# Patient Record
Sex: MALE | Race: WHITE | Employment: FULL TIME | URBAN - METROPOLITAN AREA
[De-identification: names, ages, dates, MRNs, and addresses within clinical notes are randomized per-mention and may not be internally consistent; named-entity substitution may affect disease eponyms.]

---

## 2017-10-17 ENCOUNTER — HOSPITAL ENCOUNTER (OUTPATIENT)
Dept: CT IMAGING | Age: 67
Discharge: HOME | End: 2017-10-17
Attending: UROLOGY
Payer: MEDICARE

## 2017-10-17 DIAGNOSIS — R31.9 HEMATURIA: ICD-10-CM

## 2017-10-17 LAB — CREAT BLD-MCNC: 1.2 MG/DL (ref 0.6–1.3)

## 2017-10-17 PROCEDURE — 74011636320 HC RX REV CODE- 636/320: Performed by: UROLOGY

## 2017-10-17 PROCEDURE — 82565 ASSAY OF CREATININE: CPT

## 2017-10-17 PROCEDURE — 74011000258 HC RX REV CODE- 258: Performed by: UROLOGY

## 2017-10-17 PROCEDURE — 74178 CT ABD&PLV WO CNTR FLWD CNTR: CPT

## 2017-10-17 RX ORDER — SODIUM CHLORIDE 0.9 % (FLUSH) 0.9 %
10 SYRINGE (ML) INJECTION
Status: COMPLETED | OUTPATIENT
Start: 2017-10-17 | End: 2017-10-17

## 2017-10-17 RX ADMIN — Medication 10 ML: at 16:54

## 2017-10-17 RX ADMIN — SODIUM CHLORIDE 100 ML: 900 INJECTION, SOLUTION INTRAVENOUS at 16:54

## 2017-10-17 RX ADMIN — IOPAMIDOL 100 ML: 612 INJECTION, SOLUTION INTRAVENOUS at 16:53

## 2018-11-01 ENCOUNTER — HOSPITAL ENCOUNTER (OUTPATIENT)
Dept: GENERAL RADIOLOGY | Age: 68
Discharge: HOME | End: 2018-11-01
Payer: MEDICARE

## 2018-11-01 DIAGNOSIS — R05.9 COUGH: ICD-10-CM

## 2018-11-01 PROCEDURE — 71046 X-RAY EXAM CHEST 2 VIEWS: CPT

## 2018-11-12 ENCOUNTER — HOSPITAL ENCOUNTER (OUTPATIENT)
Dept: GENERAL RADIOLOGY | Age: 68
Discharge: HOME | End: 2018-11-12
Attending: OTOLARYNGOLOGY
Payer: MEDICARE

## 2018-11-12 DIAGNOSIS — K21.9 ESOPHAGEAL REFLUX: ICD-10-CM

## 2018-11-12 PROCEDURE — 74220 X-RAY XM ESOPHAGUS 1CNTRST: CPT

## 2022-11-15 ENCOUNTER — OFFICE VISIT (OUTPATIENT)
Dept: ORTHOPEDIC SURGERY | Age: 72
End: 2022-11-15
Payer: MEDICARE

## 2022-11-15 DIAGNOSIS — M25.512 ACUTE PAIN OF LEFT SHOULDER: Primary | ICD-10-CM

## 2022-11-15 DIAGNOSIS — M54.12 CERVICAL RADICULITIS: ICD-10-CM

## 2022-11-15 PROCEDURE — 1101F PT FALLS ASSESS-DOCD LE1/YR: CPT | Performed by: ORTHOPAEDIC SURGERY

## 2022-11-15 PROCEDURE — 3017F COLORECTAL CA SCREEN DOC REV: CPT | Performed by: ORTHOPAEDIC SURGERY

## 2022-11-15 PROCEDURE — G8427 DOCREV CUR MEDS BY ELIG CLIN: HCPCS | Performed by: ORTHOPAEDIC SURGERY

## 2022-11-15 PROCEDURE — G8432 DEP SCR NOT DOC, RNG: HCPCS | Performed by: ORTHOPAEDIC SURGERY

## 2022-11-15 PROCEDURE — G8421 BMI NOT CALCULATED: HCPCS | Performed by: ORTHOPAEDIC SURGERY

## 2022-11-15 PROCEDURE — G8536 NO DOC ELDER MAL SCRN: HCPCS | Performed by: ORTHOPAEDIC SURGERY

## 2022-11-15 PROCEDURE — 1123F ACP DISCUSS/DSCN MKR DOCD: CPT | Performed by: ORTHOPAEDIC SURGERY

## 2022-11-15 PROCEDURE — 99203 OFFICE O/P NEW LOW 30 MIN: CPT | Performed by: ORTHOPAEDIC SURGERY

## 2022-11-15 RX ORDER — METHYLPREDNISOLONE 4 MG/1
TABLET ORAL
Qty: 1 DOSE PACK | Refills: 0 | Status: SHIPPED | OUTPATIENT
Start: 2022-11-15

## 2022-11-15 NOTE — LETTER
11/15/2022    Patient: Cary Castillo   YOB: 1950   Date of Visit: 11/15/2022     Arie Gutierrez MD  [address] [address]  Via Fax: [phone]    Dear Arie Gutierrez MD,      Thank you for referring Mr. Kindra Spencer to [facility] for evaluation. My notes for this consultation are attached. If you have questions, please do not hesitate to call me. I look forward to following your patient along with you.       Sincerely,    Brian Allen MD

## 2022-11-15 NOTE — PROGRESS NOTES
Cary Castillo (: 1950) is a 67 y.o. male, patient, here for evaluation of the following chief complaint(s):  Shoulder Pain (Left shoulder pain)       HPI:    Patient presents with a chief complaint left shoulder pain. Patient had a left shoulder rotator cuff repair and open biceps tenodesis performed approximately 2 years ago. Over the past 10 to 14 days he has had increasing shoulder and neck pain with radiation into his scapula and down the lateral aspect of his left arm. States that the pain is alleviated when he raises his left hand over his head. Denies any recent injury. He is already going to physical therapy for his neck and states he has decreased range of motion of his neck. He also has noticed some decreased strength in his left shoulder. Allergies   Allergen Reactions    Shellfish Derived Itching       Current Outpatient Medications   Medication Sig    oxyCODONE IR (ROXICODONE) 5 mg immediate release tablet Take 2 Tabs by mouth every four (4) hours as needed for Pain. Max Daily Amount: 60 mg.    warfarin (COUMADIN) 2.5 mg tablet Take 1 Tab by mouth daily. cyanocobalamin (VITAMIN B-12) 100 mcg tablet Take 100 mcg by mouth daily. metFORMIN (GLUCOPHAGE) 500 mg tablet Take  by mouth two (2) times daily (with meals). terazosin (HYTRIN) 5 mg capsule Take 10 mg by mouth nightly. Indications: SYMPTOMATIC BENIGN PROSTATIC HYPERPLASIA    atorvastatin (LIPITOR) 10 mg tablet Take 10 mg by mouth nightly. No current facility-administered medications for this visit. Past Medical History:   Diagnosis Date    Arthritis     Asthma     Diabetes ([facility] [address]     Ill-defined condition     NAION R EYE PARTIALLY BLIND    Sleep apnea     WEARS CPAP        Past Surgical History:   Procedure Laterality Date    HX ORTHOPAEDIC      MENISCUS REPAIR R    HX ORTHOPAEDIC      ACHILLIS TENDON REPAIR R     HX ORTHOPAEDIC      R WRIST REPAIR FROM FX.     LA ABDOMEN SURGERY PROC UNLISTED      UMBILICAL HERNIA REPAIR       Family History   Problem Relation Age of Onset    Seizures Mother     Alzheimer's Disease Father         Social History     Socioeconomic History    Marital status:      Spouse name: Not on file    Number of children: Not on file    Years of education: Not on file    Highest education level: Not on file   Occupational History    Not on file   Tobacco Use    Smoking status: Never    Smokeless tobacco: Never   Substance and Sexual Activity    Alcohol use: Yes     Comment: 2 DRINKS A MONTH    Drug use: Not on file    Sexual activity: Not on file   Other Topics Concern    Not on file   Social History Narrative    Not on file     Social Determinants of Health     Financial Resource Strain: Not on file   Food Insecurity: Not on file   Transportation Needs: Not on file   Physical Activity: Not on file   Stress: Not on file   Social Connections: Not on file   Intimate Partner Violence: Not on file   Housing Stability: Not on file       Review of Systems   Musculoskeletal:         Left shoulder pain      Vitals: There were no vitals taken for this visit. There is no height or weight on file to calculate BMI. Ortho Exam     Patient is alert and oriented x3. Patient is in no acute distress. Patient ambulates with a nonantalgic gait. Left shoulder:  No shoulder girdle atrophy. There is no soft tissue swelling, ecchymosis, abrasions or lacerations. Active range of motion of the shoulder is limited to 130° of forward flexion, 120° of lateral abduction and 70° of external rotation. Internal rotation is to the SI joint and is painful. Passive range of motion is full with a painful impingement sign and painful Jo sign. Rotator cuff strength is painful to evaluate and is at 3/5 with forward flexion, external rotation and lateral abduction. Internal rotational strength is maintained. There is no crepitation about the joint.   Palpation of the [facility] joint does not reproduce discomfort and there is no pain elicited with cross-body adduction. Strength of the extremity is 4/5 strength at biceps and 5/5 at triceps/wrist extension. DTR's are intact at +2/4 and are symmetrical.      Cervical range of motion is decreased with pain to palpation along the paraspinal musculature and medial border of the scapula. Spurling's sign is positive with radiation into his lateral shoulder. Patient has normal strength with bicep and tricep. He has normal wrist extensor strength. Neurovascular examination is in tact. XR Results (most recent):  Results from Appointment encounter on 11/15/22    XR SHOULDER LT AP/LAT MIN 2 V    Narrative  Three-view x-ray of the left shoulder reveals retained hardware from prior bicep tenodesis. He has some minimal changes consistent with glenohumeral arthritis. Otherwise unremarkable         ASSESSMENT/PLAN:    We discussed the patient that he does have weakness and positive cervical radiculopathy physical exam signs. He has already begun physical therapy for his neck prescribed by an outside provider. He should continue physical therapy. We will add a Medrol Dosepak to decrease his inflammation. He will return to the office in 4 weeks. If he does not improve and still has the same symptoms we will consider a cervical MRI.   Patient is to call if his symptoms worsen and I would recommend a MRI of his cervical spine        Basim Trinh MD

## 2022-12-15 ENCOUNTER — OFFICE VISIT (OUTPATIENT)
Dept: ORTHOPEDIC SURGERY | Age: 72
End: 2022-12-15
Payer: MEDICARE

## 2022-12-15 VITALS — WEIGHT: 217 LBS | BODY MASS INDEX: 31.59 KG/M2

## 2022-12-15 DIAGNOSIS — M54.2 NECK PAIN: Primary | ICD-10-CM

## 2022-12-15 DIAGNOSIS — M54.12 CERVICAL RADICULITIS: ICD-10-CM

## 2022-12-15 NOTE — PROGRESS NOTES
Abena Tinsley (: 1950) is a 67 y.o. male, patient, here for evaluation of the following chief complaint(s):  Shoulder Pain (Left shoulder pain)       HPI:    Patient returns to the office with recurrent discomfort of the left upper extremity. Once again, he presents today with pain that seems to radiate all the way down the arm. He does have some shoulder pain. He has had physical therapy. He states he feels a little bit better. However, his chief complaint is he still notes some weakness of the arm. I asked him specifically if he felt the weakness was secondary to pain. He states no its not really painful it is just weak    Allergies   Allergen Reactions    Shellfish Derived Itching       Current Outpatient Medications   Medication Sig    methylPREDNISolone (MEDROL DOSEPACK) 4 mg tablet Per dose pack instructions    oxyCODONE IR (ROXICODONE) 5 mg immediate release tablet Take 2 Tabs by mouth every four (4) hours as needed for Pain. Max Daily Amount: 60 mg.    warfarin (COUMADIN) 2.5 mg tablet Take 1 Tab by mouth daily. cyanocobalamin (VITAMIN B-12) 100 mcg tablet Take 100 mcg by mouth daily. metFORMIN (GLUCOPHAGE) 500 mg tablet Take  by mouth two (2) times daily (with meals). terazosin (HYTRIN) 5 mg capsule Take 10 mg by mouth nightly. Indications: SYMPTOMATIC BENIGN PROSTATIC HYPERPLASIA    atorvastatin (LIPITOR) 10 mg tablet Take 10 mg by mouth nightly. No current facility-administered medications for this visit. Past Medical History:   Diagnosis Date    Arthritis     Asthma     Diabetes ([address]     Ill-defined condition     NAION R EYE PARTIALLY BLIND    Sleep apnea     WEARS CPAP        Past Surgical History:   Procedure Laterality Date    HX ORTHOPAEDIC      MENISCUS REPAIR R    HX ORTHOPAEDIC      ACHILLIS TENDON REPAIR R     HX ORTHOPAEDIC      R WRIST REPAIR FROM FX.     CT ABDOMEN SURGERY PROC UNLISTED      UMBILICAL HERNIA REPAIR       Family History   Problem Relation Age of Onset    Seizures Mother     Alzheimer's Disease Father         Social History     Socioeconomic History    Marital status:      Spouse name: Not on file    Number of children: Not on file    Years of education: Not on file    Highest education level: Not on file   Occupational History    Not on file   Tobacco Use    Smoking status: Never    Smokeless tobacco: Never   Substance and Sexual Activity    Alcohol use: Yes     Comment: 2 DRINKS A MONTH    Drug use: Not on file    Sexual activity: Not on file   Other Topics Concern    Not on file   Social History Narrative    Not on file     Social Determinants of Health     Financial Resource Strain: Not on file   Food Insecurity: Not on file   Transportation Needs: Not on file   Physical Activity: Not on file   Stress: Not on file   Social Connections: Not on file   Intimate Partner Violence: Not on file   Housing Stability: Not on file       Review of Systems   Musculoskeletal:         Shoulder pain     Vitals: Wt 217 lb (98.4 kg)   BMI 31.59 kg/m²    Body mass index is 31.59 kg/m². Ortho Exam     Patient is alert and oriented x3. Patient is in no acute distress. Patient ambulates with a nonantalgic gait. Left shoulder: Patient has full range of motion left shoulder. He has negative impingement sign mildly positive Jo test.  His strength is decreased at 4/5 in all planes. He has no crepitation. He has no swelling noted distally. Neurovascular examination is intact. Cervical spine: Patient has limitation of cervical spine rotation particularly lateral rotation to the right and extension. He has recurrent pain that radiates into his left trapezius with manipulation of his neck. A Spurling sign however is negative. Perhaps a small amount of bicep weakness when compared to the contralateral arm. Otherwise strength is normal.    ASSESSMENT/PLAN:    Patient returns to the office with recurrent discomfort of left upper extremity.   Once again, his symptoms are more consistent with cervical radiculitis. Therefore, I would suggest proceeding with an MRI evaluation. Patient agrees and is to return to the office after the MRI we will put a hold on physical therapy at this stage.         Pearl Marin MD

## 2022-12-15 NOTE — LETTER
12/15/2022    Patient: Ravi Jorge   YOB: 1950   Date of Visit: 12/15/2022     Darron Devine MD  [address] [address]  Via Fax: [phone]    Dear Darron Devine MD,      Thank you for referring Mr. Moncho Castle to [facility] for evaluation. My notes for this consultation are attached. If you have questions, please do not hesitate to call me. I look forward to following your patient along with you.       Sincerely,    Heriberto Walker MD

## 2023-01-09 ENCOUNTER — OFFICE VISIT (OUTPATIENT)
Dept: ORTHOPEDIC SURGERY | Age: 73
End: 2023-01-09
Payer: MEDICARE

## 2023-01-09 DIAGNOSIS — M54.12 CERVICAL RADICULITIS: Primary | ICD-10-CM

## 2023-01-09 NOTE — LETTER
1/9/2023    Patient: Foster Porter   YOB: 1950   Date of Visit: 1/9/2023     Ashwin Beckman MD  [address] [address]  Via Fax: [phone]    Dear Ashwin Beckman MD,      Thank you for referring Mr. Lobo Pruitt to [facility] for evaluation. My notes for this consultation are attached. If you have questions, please do not hesitate to call me. I look forward to following your patient along with you.       Sincerely,    Lizzie Rowe MD

## 2023-01-09 NOTE — PROGRESS NOTES
Hillary Chen (: 1950) is a 67 y.o. male, patient, here for evaluation of the following chief complaint(s):  Shoulder Pain (Left shoulder MRI results)       HPI:    Patient presents the office today status post MRI evaluation of cervical spine. Patient continues to have on and off discomfort of the left upper extremity. Allergies   Allergen Reactions    Shellfish Derived Itching       Current Outpatient Medications   Medication Sig    methylPREDNISolone (MEDROL DOSEPACK) 4 mg tablet Per dose pack instructions    oxyCODONE IR (ROXICODONE) 5 mg immediate release tablet Take 2 Tabs by mouth every four (4) hours as needed for Pain. Max Daily Amount: 60 mg.    warfarin (COUMADIN) 2.5 mg tablet Take 1 Tab by mouth daily. cyanocobalamin (VITAMIN B-12) 100 mcg tablet Take 100 mcg by mouth daily. metFORMIN (GLUCOPHAGE) 500 mg tablet Take  by mouth two (2) times daily (with meals). terazosin (HYTRIN) 5 mg capsule Take 10 mg by mouth nightly. Indications: SYMPTOMATIC BENIGN PROSTATIC HYPERPLASIA    atorvastatin (LIPITOR) 10 mg tablet Take 10 mg by mouth nightly. No current facility-administered medications for this visit. Past Medical History:   Diagnosis Date    Arthritis     Asthma     Diabetes ([address]     Ill-defined condition     NAION R EYE PARTIALLY BLIND    Sleep apnea     WEARS CPAP        Past Surgical History:   Procedure Laterality Date    HX ORTHOPAEDIC      MENISCUS REPAIR R    HX ORTHOPAEDIC      ACHILLIS TENDON REPAIR R     HX ORTHOPAEDIC      R WRIST REPAIR FROM FX.     SD ABDOMEN SURGERY PROC UNLISTED      UMBILICAL HERNIA REPAIR       Family History   Problem Relation Age of Onset    Seizures Mother     Alzheimer's Disease Father         Social History     Socioeconomic History    Marital status:      Spouse name: Not on file    Number of children: Not on file    Years of education: Not on file    Highest education level: Not on file   Occupational History    Not on file Tobacco Use    Smoking status: Never    Smokeless tobacco: Never   Substance and Sexual Activity    Alcohol use: Yes     Comment: 2 DRINKS A MONTH    Drug use: Not on file    Sexual activity: Not on file   Other Topics Concern    Not on file   Social History Narrative    Not on file     Social Determinants of Health     Financial Resource Strain: Not on file   Food Insecurity: Not on file   Transportation Needs: Not on file   Physical Activity: Not on file   Stress: Not on file   Social Connections: Not on file   Intimate Partner Violence: Not on file   Housing Stability: Not on file       Review of Systems   Musculoskeletal:         Left shoulder      Vitals: There were no vitals taken for this visit. There is no height or weight on file to calculate BMI. Ortho Exam     Patient is alert and oriented x3. Patient is in no acute distress. Patient ambulates with a nonantalgic gait. Left shoulder: Patient has full range of motion left shoulder. He has negative impingement sign mildly positive Jo test.  His strength is decreased at 4/5 in all planes. He has no crepitation. He has no swelling noted distally. Neurovascular examination is intact. Cervical spine: Patient has limitation of cervical spine rotation particularly lateral rotation to the right and extension. He has recurrent pain that radiates into his left trapezius with manipulation of his neck. A Spurling sign however is negative. Perhaps a small amount of bicep weakness when compared to the contralateral arm. Otherwise strength is normal      MRI evaluation shows evidence of C4-5 disc/facet compromise particularly along the lateral foramen of C4-5 left. ASSESSMENT/PLAN:    Finding on the MRI are more consistent with the patient's examination. Patient has an appointment already scheduled with Dr. Wes Benton. I have encouraged him to hold onto that appointment. This may require surgical attention. We talked about other options. This will be addressed more through the expertise of Dr. Lise Gamez.         Keith Mtz MD not applicable (Male)

## 2023-01-19 ENCOUNTER — OFFICE VISIT (OUTPATIENT)
Dept: ORTHOPEDIC SURGERY | Age: 73
End: 2023-01-19
Payer: MEDICARE

## 2023-01-19 VITALS — BODY MASS INDEX: 30.78 KG/M2 | WEIGHT: 215 LBS | HEIGHT: 70 IN

## 2023-01-19 DIAGNOSIS — M54.12 CERVICAL RADICULITIS: Primary | ICD-10-CM

## 2023-01-19 DIAGNOSIS — M50.90 CERVICAL DISC DISEASE: ICD-10-CM

## 2023-01-19 DIAGNOSIS — M48.02 CERVICAL STENOSIS OF SPINAL CANAL: ICD-10-CM

## 2023-01-19 NOTE — PROGRESS NOTES
1. Have you been to the ER, urgent care clinic since your last visit? Hospitalized since your last visit? No    2. Have you seen or consulted any other health care providers outside of the [address] [place] [address] since your last visit? Include any pap smears or colon screening.  No    Chief Complaint   Patient presents with    Neck Pain     Left Shoulder, Cervical MRI 12/28/22

## 2023-01-19 NOTE — LETTER
1/20/2023    Patient: Delia Haddad   YOB: 1950   Date of Visit: 1/19/2023     Kari Pimentel MD  [address] [facility] [address]  Via Fax: [phone]    Dear Kari Pimentel MD,      Thank you for referring Mr. Randall Councilman to [facility] for evaluation. My notes for this consultation are attached. If you have questions, please do not hesitate to call me. I look forward to following your patient along with you.       Sincerely,    Gonzalo Kitchen MD

## 2023-01-20 DIAGNOSIS — M54.2 NECK PAIN: ICD-10-CM

## 2023-01-20 DIAGNOSIS — M54.12 CERVICAL RADICULITIS: Primary | ICD-10-CM

## 2023-01-20 DIAGNOSIS — M25.512 ACUTE PAIN OF LEFT SHOULDER: ICD-10-CM

## 2023-01-20 NOTE — PROGRESS NOTES
Ketan Burns (: 1950) is a 67 y.o. male, patient, here for evaluation of the following chief complaint(s):  Neck Pain (Left Shoulder, Cervical MRI 22)       ASSESSMENT/PLAN:    Below is the assessment and plan developed based on review of pertinent history, physical exam, labs, studies, and medications. He has had chronic worsening severe left shoulder pain and weakness. MRI was reviewed today as well as physical exam.  He has a left-sided foraminal stenosis due to disc bulging as well as facet hypertrophy causing severe foraminal stenosis at C4-5. I think is a candidate for an ACDF at that level given the conservative treatment that he has failed as well as the weakness that he has persistently in the left shoulder. Risk and benefits were discussed with him and his wife. He would like to proceed. Procedure: ACDF C4-5      The risks and benefits were discussed at length with the patient and the patient has elected to proceed. Indications for surgery include failed conservative treatment. Alternative treatments, risks and the perioperative course were discussed with the patient. All questions were answered. The risks and benefits of the procedure were explained. Benefits include definitive diagnosis, relief of pain, elimination of deformity and improved function. Risks of surgery including bleeding, infection, weakness, numbness, CSF leak, failure to improve symptoms, exacerbation of medical co-morbidities and even death were discussed with the patient. 1. Cervical radiculitis  2. Cervical disc disease  3. Cervical stenosis of spinal canal      Return in about 6 weeks (around 3/2/2023) for Physician Assistant for Postop Visit. SUBJECTIVE/OBJECTIVE:  Ketan Burns (: 1950) is a 67 y.o. male. No flowsheet data found. He comes in today on referral for severe left shoulder pain and weakness.   He has had bilateral shoulder surgeries but over the last 6 months he has had increasing pain and weakness in the left shoulder. Pain is worse at night. He has had an evaluation by Dr. Miki Carbajal who feels that it is coming from his neck. He has an MRI of his cervical spine. He cannot lay on the left side. He denies any bowel bladder difficulties. Imaging:    XR Results (most recent):    I reviewed his outside x-rays. He has maintained cervical lordosis. No subluxations noted. No fractures or lytic lesions. MRI Results (most recent):  Results from Appointment encounter on 12/28/22    MRI CERV SPINE WO CONT    Narrative  EXAM: MRI CERV SPINE WO CONT    INDICATION: .  Pain    Exam: MRI cervical spine. Comparisons: none    Sequences: Sagittal T1, T2, and STIR. Axial T1, T2. No contrast.    FINDINGS: Alignment is normal. There are no suspicious marrow lesions or  evidence of acute bony trauma. The visualized posterior fossa and cord signal  are normal. Paraspinous soft tissues are within normal limits. Craniocervical junction: Intact. Without stenosis    C2-C3: Left-sided uncovertebral degenerative change and facet arthropathy. Mild  left foraminal narrowing    C3-C4: Right-sided uncovertebral degenerative change. Bilateral facet  arthropathy. Moderate right foraminal narrowing without canal stenosis    C4-C5: Left-sided uncovertebral degenerative change and facet arthropathy. Severe left foraminal narrowing and slight indentation of the thecal sac    C5-C6: Slight disc osteophytic bulge with uncovertebral degenerative change and  left facet arthropathy. Minimal indentation of the ventral thecal sac with mild  left greater than right foraminal narrowing    C6-C7: Small disc osteophytic bulge with facet arthropathy and uncovertebral  degenerative change. Mild canal and foraminal narrowing    Examination of the upper thoracic spine demonstrates no significant stenosis. Impression  1.  Multilevel degenerative change detailed by level above most significant at  C4-5 Allergies   Allergen Reactions    Shellfish Derived Itching       Current Outpatient Medications   Medication Sig    methylPREDNISolone (MEDROL DOSEPACK) 4 mg tablet Per dose pack instructions    oxyCODONE IR (ROXICODONE) 5 mg immediate release tablet Take 2 Tabs by mouth every four (4) hours as needed for Pain. Max Daily Amount: 60 mg.    warfarin (COUMADIN) 2.5 mg tablet Take 1 Tab by mouth daily. cyanocobalamin (VITAMIN B12) 100 mcg tablet Take 100 mcg by mouth daily. metFORMIN (GLUCOPHAGE) 500 mg tablet Take  by mouth two (2) times daily (with meals). terazosin (HYTRIN) 5 mg capsule Take 10 mg by mouth nightly. Indications: SYMPTOMATIC BENIGN PROSTATIC HYPERPLASIA    atorvastatin (LIPITOR) 10 mg tablet Take 10 mg by mouth nightly. No current facility-administered medications for this visit. Past Medical History:   Diagnosis Date    Arthritis     Asthma     Diabetes ([address]     Ill-defined condition     NAION R EYE PARTIALLY BLIND    Sleep apnea     WEARS CPAP        Past Surgical History:   Procedure Laterality Date    HX ORTHOPAEDIC      MENISCUS REPAIR R    HX ORTHOPAEDIC      ACHILLIS TENDON REPAIR R     HX ORTHOPAEDIC      R WRIST REPAIR FROM FX. IN UNLISTED PROCEDURE ABDOMEN PERITONEUM & OMENTUM      UMBILICAL HERNIA REPAIR       Family History   Problem Relation Age of Onset    Seizures Mother     Alzheimer's Disease Father         Social History     Tobacco Use    Smoking status: Never    Smokeless tobacco: Never   Substance Use Topics    Alcohol use: Yes     Comment: 2 DRINKS A MONTH        Review of Systems       Vitals:  Ht 5' 10\" (1.778 m)   Wt 215 lb (97.5 kg)   BMI 30.85 kg/m²    Body mass index is 30.85 kg/m². Ortho Exam       Alert and [place]  x 3    Normal gait and station; normal posture    No assistive devices today.     Lumbar spine:  Examination of the lumbar spine demonstrates no tenderness on palpation, no pain, no swelling or edema, with normal lumbar range of motion. Thoracic spine: Examination of the thoracic spine demonstrates no tenderness on palpation, no pain, no swelling or edema. Normal sensation and range of motion. Cervical spine:     Examination of the cervical spine demonstrates on inspection: No abnormal cutaneous markings. No surgical incisions. No shoulder asymmetry. No masses. On palpation: Minimal muscular spasm noted. Tenderness on palpation superior deltoid region. Pain in the upper biceps region. Range of motion: Cervical range of motion is well-maintained in today. Slight decrease in rotation to the left. No guarding noted. Motor examination:  Right deltoid 5/5,  left deltoid 3/5, right bicep 5/5, left bicep 4/5, right wrist extensor 5/5, left wrist extensor 5/5, right triceps 5/5, left triceps 5/5, right intrinsics 5/5, left intrinsics    Sensory examination: Reveals no deficits    Reflexes: Left bicep 2/2, left triceps 2/2, right biceps 2/2, right triceps 2/2    Functional testing: Spurling's exam is positive on the left; upper limb tension test is positive on the left    Argueta's signs negative bilaterally. An electronic signature was used to authenticate this note.   -- Jenni Velásquez MD

## 2023-01-20 NOTE — PATIENT INSTRUCTIONS
Cervical Spinal Fusion: Before Your Surgery  What is cervical spinal fusion? Cervical spinal fusion is surgery that joins two or more of the vertebrae in your neck. When these bones are joined together, it's called fusion. After the joints are fused, they can no longer move. During the surgery, the doctor uses bone to make a \"bridge\" between your vertebrae. This bridge may be strengthened with metal plates and screws. In most cases, the doctor uses bone from another part of your body or bone that has been donated to a bone bank. But sometimes human-made bone is used. To do the surgery, the doctor makes a cut in either the front or the back of your neck. The cut is called an incision. It leaves a scar that fades with time. After surgery, you will have a short hospital stay. Your neck will feel stiff or sore. You will get medicine to help with pain. Most people can go back to work after 4 to 6 weeks. But it may take a few months to get back to your usual activities. How do you prepare for surgery? Surgery can be stressful. This information will help you understand what you can expect. And it will help you safely prepare for surgery. Preparing for surgery    Be sure you have someone to take you home. Anesthesia and pain medicine will make it unsafe for you to drive or get home on your own. Understand exactly what surgery is planned, along with the risks, benefits, and other options. If you take a medicine that prevents blood clots, your doctor may tell you to stop taking it before your surgery. Or your doctor may tell you to keep taking it. (These medicines include aspirin and other blood thinners.) Make sure that you understand exactly what your doctor wants you to do. Tell your doctor ALL the medicines, vitamins, supplements, and herbal remedies you take. Some may increase the risk of problems during your surgery.  Your doctor will tell you if you should stop taking any of them before the surgery and how soon to do it. Make sure your doctor and the hospital have a copy of your advance directive. If you don't have one, you may want to prepare one. It lets others know your health care wishes. It's a good thing to have before any type of surgery or procedure. What happens on the day of surgery? Follow the instructions exactly about when to stop eating and drinking. If you don't, your surgery may be canceled. If your doctor told you to take your medicines on the day of surgery, take them with only a sip of water. Take a bath or shower before you come in for your surgery. Do not apply lotions, perfumes, deodorants, or nail polish. Do not shave the surgical site yourself. Take off all jewelry and piercings. And take out contact lenses, if you wear them. At the hospital or surgery center   Bring a picture ID. The area for surgery is often marked to make sure there are no errors. You will be kept comfortable and safe by your anesthesia provider. You will be asleep during the surgery. The surgery usually takes 2 to 4 hours. If more than two vertebrae are being fused together, it will take longer. When you wake up, you will be lying on your back. You will have a soft or hard collar around your neck. This will protect and support your neck. It will also keep you from turning your head. You may have a small plastic tube coming out of your incision. This is to drain fluids. It's usually taken out in 1 or 2 days. When should you call your doctor? You have questions or concerns. You do not understand how to prepare for your surgery. You become ill before surgery (such as fever, flu, or a cold). You need to reschedule or have changed your mind about having the surgery. Where can you learn more? Go to http://www.gray.com/  Enter N543 in the search box to learn more about \"Cervical Spinal Fusion: Before Your Surgery. \"  Current as of: March 9, 2022               Content Version: 13.4  © 2021-6079 Healthwise, [facility]. Care instructions adapted under license by [employer] (which disclaims liability or warranty for this information). If you have questions about a medical condition or this instruction, always ask your healthcare professional. [place] [address] any warranty or liability for your use of this information.

## 2023-02-08 ENCOUNTER — HOSPITAL ENCOUNTER (OUTPATIENT)
Dept: PREADMISSION TESTING | Age: 73
Discharge: HOME | End: 2023-02-08
Payer: MEDICARE

## 2023-02-08 VITALS
HEART RATE: 87 BPM | BODY MASS INDEX: 32.82 KG/M2 | WEIGHT: 229.28 LBS | TEMPERATURE: 98.3 F | SYSTOLIC BLOOD PRESSURE: 157 MMHG | HEIGHT: 70 IN | DIASTOLIC BLOOD PRESSURE: 79 MMHG

## 2023-02-08 LAB
ABO + RH BLD: NORMAL
APPEARANCE UR: CLEAR
BACTERIA URNS QL MICRO: NEGATIVE /HPF
BILIRUB UR QL: NEGATIVE
BLOOD GROUP ANTIBODIES SERPL: NORMAL
COLOR UR: NORMAL
EPITH CASTS URNS QL MICRO: NORMAL /LPF
GLUCOSE UR STRIP.AUTO-MCNC: NEGATIVE MG/DL
HGB UR QL STRIP: NEGATIVE
KETONES UR QL STRIP.AUTO: NEGATIVE MG/DL
LEUKOCYTE ESTERASE UR QL STRIP.AUTO: NEGATIVE
NITRITE UR QL STRIP.AUTO: NEGATIVE
PH UR STRIP: 5.5 (ref 5–8)
PROT UR STRIP-MCNC: NEGATIVE MG/DL
RBC #/AREA URNS HPF: NORMAL /HPF (ref 0–5)
SP GR UR REFRACTOMETRY: 1.02 (ref 1–1.03)
SPECIMEN EXP DATE BLD: NORMAL
UA: UC IF INDICATED,UAUC: NORMAL
UROBILINOGEN UR QL STRIP.AUTO: 0.2 EU/DL (ref 0.2–1)
WBC URNS QL MICRO: NORMAL /HPF (ref 0–4)

## 2023-02-08 PROCEDURE — 86900 BLOOD TYPING SEROLOGIC ABO: CPT

## 2023-02-08 PROCEDURE — 36415 COLL VENOUS BLD VENIPUNCTURE: CPT

## 2023-02-08 PROCEDURE — 81001 URINALYSIS AUTO W/SCOPE: CPT

## 2023-02-08 RX ORDER — GLIMEPIRIDE 1 MG/1
1 TABLET ORAL
COMMUNITY

## 2023-02-08 RX ORDER — HYDROCHLOROTHIAZIDE 25 MG/1
25 TABLET ORAL 2 TIMES DAILY
COMMUNITY

## 2023-02-08 RX ORDER — FINASTERIDE 5 MG/1
5 TABLET, FILM COATED ORAL DAILY
COMMUNITY

## 2023-02-08 RX ORDER — LEVOTHYROXINE AND LIOTHYRONINE 38; 9 UG/1; UG/1
60 TABLET ORAL DAILY
COMMUNITY

## 2023-02-08 RX ORDER — DOXYCYCLINE 100 MG/1
100 CAPSULE ORAL 2 TIMES DAILY
COMMUNITY

## 2023-02-08 RX ORDER — LOSARTAN POTASSIUM 25 MG/1
25 TABLET ORAL DAILY
COMMUNITY

## 2023-02-08 NOTE — H&P
Yonas President (: 1950) is a 67 y.o. male, patient, here for evaluation of the following chief complaint(s):  Neck Pain (Left Shoulder, Cervical MRI 22)        ASSESSMENT/PLAN:     Below is the assessment and plan developed based on review of pertinent history, physical exam, labs, studies, and medications. He has had chronic worsening severe left shoulder pain and weakness. MRI was reviewed today as well as physical exam.  He has a left-sided foraminal stenosis due to disc bulging as well as facet hypertrophy causing severe foraminal stenosis at C4-5. I think is a candidate for an ACDF at that level given the conservative treatment that he has failed as well as the weakness that he has persistently in the left shoulder. Risk and benefits were discussed with him and his wife. He would like to proceed. Procedure: ACDF C4-5        The risks and benefits were discussed at length with the patient and the patient has elected to proceed. Indications for surgery include failed conservative treatment. Alternative treatments, risks and the perioperative course were discussed with the patient. All questions were answered. The risks and benefits of the procedure were explained. Benefits include definitive diagnosis, relief of pain, elimination of deformity and improved function. Risks of surgery including bleeding, infection, weakness, numbness, CSF leak, failure to improve symptoms, exacerbation of medical co-morbidities and even death were discussed with the patient. 1. Cervical radiculitis  2. Cervical disc disease  3. Cervical stenosis of spinal canal        Return in about 6 weeks (around 3/2/2023) for Physician Assistant for Postop Visit. Date of Surgery Update:  Yonas Prestheodore was seen and examined. History and physical has been reviewed. The patient has been examined.  There have been no significant clinical changes since the completion of the originally dated History and Physical.    Signed By: Wai Nicholson MD     February 15, 2023 9:51 AM             SUBJECTIVE/OBJECTIVE:  Celestine Frye (: 1950) is a 67 y.o. male. No flowsheet data found. He comes in today on referral for severe left shoulder pain and weakness. He has had bilateral shoulder surgeries but over the last 6 months he has had increasing pain and weakness in the left shoulder. Pain is worse at night. He has had an evaluation by Dr. Suleman Youngblood who feels that it is coming from his neck. He has an MRI of his cervical spine. He cannot lay on the left side. He denies any bowel bladder difficulties. Imaging:     XR Results (most recent):     I reviewed his outside x-rays. He has maintained cervical lordosis. No subluxations noted. No fractures or lytic lesions. MRI Results (most recent):  Results from Appointment encounter on 22     MRI CERV SPINE WO CONT     Narrative  EXAM: MRI CERV SPINE WO CONT     INDICATION: .  Pain     Exam: MRI cervical spine. Comparisons: none     Sequences: Sagittal T1, T2, and STIR. Axial T1, T2. No contrast.     FINDINGS: Alignment is normal. There are no suspicious marrow lesions or  evidence of acute bony trauma. The visualized posterior fossa and cord signal  are normal. Paraspinous soft tissues are within normal limits. Craniocervical junction: Intact. Without stenosis     C2-C3: Left-sided uncovertebral degenerative change and facet arthropathy. Mild  left foraminal narrowing     C3-C4: Right-sided uncovertebral degenerative change. Bilateral facet  arthropathy. Moderate right foraminal narrowing without canal stenosis     C4-C5: Left-sided uncovertebral degenerative change and facet arthropathy. Severe left foraminal narrowing and slight indentation of the thecal sac     C5-C6: Slight disc osteophytic bulge with uncovertebral degenerative change and  left facet arthropathy.  Minimal indentation of the ventral thecal sac with mild  left greater than right foraminal narrowing     C6-C7: Small disc osteophytic bulge with facet arthropathy and uncovertebral  degenerative change. Mild canal and foraminal narrowing     Examination of the upper thoracic spine demonstrates no significant stenosis. Impression  1. Multilevel degenerative change detailed by level above most significant at  C4-5                Allergies   Allergen Reactions    Shellfish Derived Itching              Current Outpatient Medications   Medication Sig    methylPREDNISolone (MEDROL DOSEPACK) 4 mg tablet Per dose pack instructions    oxyCODONE IR (ROXICODONE) 5 mg immediate release tablet Take 2 Tabs by mouth every four (4) hours as needed for Pain. Max Daily Amount: 60 mg.    warfarin (COUMADIN) 2.5 mg tablet Take 1 Tab by mouth daily. cyanocobalamin (VITAMIN B12) 100 mcg tablet Take 100 mcg by mouth daily. metFORMIN (GLUCOPHAGE) 500 mg tablet Take  by mouth two (2) times daily (with meals). terazosin (HYTRIN) 5 mg capsule Take 10 mg by mouth nightly. Indications: SYMPTOMATIC BENIGN PROSTATIC HYPERPLASIA    atorvastatin (LIPITOR) 10 mg tablet Take 10 mg by mouth nightly. No current facility-administered medications for this visit. Past Medical History:   Diagnosis Date    Arthritis      Asthma      Diabetes ([address]      Ill-defined condition       NAION R EYE PARTIALLY BLIND    Sleep apnea       WEARS CPAP               Past Surgical History:   Procedure Laterality Date    HX ORTHOPAEDIC         MENISCUS REPAIR R    HX ORTHOPAEDIC         ACHILLIS TENDON REPAIR R     HX ORTHOPAEDIC         R WRIST REPAIR FROM FX. ME UNLISTED PROCEDURE ABDOMEN PERITONEUM & OMENTUM         UMBILICAL HERNIA REPAIR               Family History   Problem Relation Age of Onset    Seizures Mother      Alzheimer's Disease Father           Social History            Tobacco Use    Smoking status: Never    Smokeless tobacco: Never   Substance Use Topics    Alcohol use:  Yes Comment: 2 DRINKS A MONTH         Review of Systems        Vitals:  Ht 5' 10\" (1.778 m)   Wt 215 lb (97.5 kg)   BMI 30.85 kg/m²    Body mass index is 30.85 kg/m². Ortho Exam         Alert and [place]  x 3     Normal gait and station; normal posture     No assistive devices today. Lumbar spine:  Examination of the lumbar spine demonstrates no tenderness on palpation, no pain, no swelling or edema, with normal lumbar range of motion. Thoracic spine: Examination of the thoracic spine demonstrates no tenderness on palpation, no pain, no swelling or edema. Normal sensation and range of motion. Cervical spine:      Examination of the cervical spine demonstrates on inspection: No abnormal cutaneous markings. No surgical incisions. No shoulder asymmetry. No masses. On palpation: Minimal muscular spasm noted. Tenderness on palpation superior deltoid region. Pain in the upper biceps region. Range of motion: Cervical range of motion is well-maintained in today. Slight decrease in rotation to the left. No guarding noted. Motor examination:  Right deltoid 5/5,  left deltoid 3/5, right bicep 5/5, left bicep 4/5, right wrist extensor 5/5, left wrist extensor 5/5, right triceps 5/5, left triceps 5/5, right intrinsics 5/5, left intrinsics     Sensory examination: Reveals no deficits     Reflexes: Left bicep 2/2, left triceps 2/2, right biceps 2/2, right triceps 2/2     Functional testing: Spurling's exam is positive on the left; upper limb tension test is positive on the left     Argueta's signs negative bilaterally. An electronic signature was used to authenticate this note.   -- Parth Coles MD

## 2023-02-08 NOTE — PERIOP NOTES
[address] INSTRUCTIONS  ORTHOPAEDIC    Surgery Date:   2/15/23    Your surgeon's office or [facility] staff will call you between 4 PM- 8 PM the day before surgery with your arrival time. If your surgery is on a Monday, you will receive a call the preceding Friday. Please report to [facility] Patient Access/Admitting on the 1st floor. Bring your insurance card, photo identification, and any copayment (if applicable). If you are going home the same day of your surgery, you must have a responsible adult to drive you home. You need to have a responsible adult to stay with you the first 24 hours after surgery and you should not drive a car for 24 hours following your surgery. Do NOT eat any solid foods after midnight the night before surgery including candy, mints or gum. You may drink clear liquids from midnight until 1 hour prior to arrival time. You may drink up to 12 ounces at one time every 4 hours. Do NOT drink alcohol or smoke 24 hours before surgery. STOP smoking for 14 days prior as it helps with breathing and healing after surgery. If your arrival time is 3pm or later, you may eat a light breakfast before 8am (toast, bagel-no butter, black coffee, plain tea, fruit juice-no pulp) Please note special instructions, if applicable, below for medications. If you are being admitted to the hospital,please leave personal belongings/luggage in your car until you have an assigned hospital room number. Please wear comfortable clothes. Wear your glasses instead of contacts. We ask that all money, jewelry and valuables be left at home. Wear no make up, particularly mascara, the day of surgery. All body piercings, rings, and jewelry need to be removed and left at home. Please remove any nail polish or artificial nails from your fingernails. Please wear your hair loose or down. Please no pony-tails, buns, or any metal hair accessories.  If you shower the morning of surgery, please do not apply any lotions or powders afterwards. You may wear deodorant. Do not shave any body area within 24 hours of your surgery. Please follow all instructions to avoid any potential surgical cancellation. Should your physical condition change, (i.e. fever, cold, flu, etc.) please notify your surgeon as soon as possible. It is important to be on time. If a situation occurs where you may be delayed, please call:  [phone] / [phone] on the day of surgery. The Preadmission Testing staff can be reached at [phone]. Special instructions: BRING ADVANCED DIRECTIVE IF YOU'D LIKE A COPY ON FILE W/ ST. 'S. Current Outpatient Medications   Medication Sig    hydroCHLOROthiazide (HYDRODIURIL) 25 mg tablet Take 25 mg by mouth two (2) times a day. losartan (COZAAR) 25 mg tablet Take 25 mg by mouth daily. glimepiride (AMARYL) 1 mg tablet Take 1 mg by mouth Daily (before breakfast). doxycycline (MONODOX) 100 mg capsule Take 100 mg by mouth two (2) times a day. thyroid, Pork, (ARMOUR) 60 mg tablet Take 60 mg by mouth daily. finasteride (PROSCAR) 5 mg tablet Take 5 mg by mouth daily. cyanocobalamin (VITAMIN B12) 100 mcg tablet Take 100 mcg by mouth daily. metFORMIN (GLUCOPHAGE) 500 mg tablet Take 500 mg by mouth two (2) times daily (with meals). 1 PILL PO QAM , 2 PO WITH DINNER    terazosin (HYTRIN) 5 mg capsule Take 10 mg by mouth nightly. Indications: SYMPTOMATIC BENIGN PROSTATIC HYPERPLASIA    atorvastatin (LIPITOR) 10 mg tablet Take 10 mg by mouth nightly. No current facility-administered medications for this encounter. YOU MUST ONLY TAKE THESE MEDICATIONS THE MORNING OF SURGERY WITH A SIP OF WATER: TERAZOSIN, DOXYCYLINE, THYROID    MEDICATIONS TO TAKE THE MORNING OF SURGERY ONLY IF NEEDED: NONE    HOLD these prescription medications BEFORE Surgery: HOLD METFORMIN FOR 24HRS PRIOR TO SURGER. DO NOT TAKE IT ON 2/14/23 OR THE MORNING OF SURGERY.     Ask your surgeon/prescribing physician about when/if to STOP taking these medications: NONE    Stop any non-steroidal anti-inflammatory drugs (i.e. Ibuprofen, Naproxen, Advil, Aleve) 3 days before surgery. You may take Tylenol. STOP all vitamins and herbal supplements 1 week prior to  surgery. If you are currently taking Plavix, Coumadin, or any other blood-thinning/anticoagulant medication contact your prescribing physician for instructions. Preventing Infections Before and After - Your Surgery    IMPORTANT INSTRUCTIONS    You play an important role in your health and preparation for surgery. To reduce the germs on your skin you will need to shower with CHG soap (Chorhexidine gluconate 4%) two times before surgery. CHG soap (Hibiclens, Hex-A-Clens or store brand)  CHG soap will be provided at your Preadmission Testing (PAT) appointment. If you do not have a PAT appointment before surgery, you may arrange to  CHG soap from our office or purchase CHG soap at a pharmacy, grocery or department store. You need to purchase TWO 4 ounce bottles to use for your 2 showers. Steps to follow:  Binnie Brian your hair with your normal shampoo and your body with regular soap and rinse well to remove shampoo and soap from your skin. Wet a clean washcloth and turn off the shower. Put CHG soap on washcloth and apply to your entire body from the neck down. Do not use on your head, face or private parts(genitals). Do not use CHG soap on open sores, wounds or areas of skin irritation. Wash you body gently for 5 minutes. Do not wash your skin too hard. This soap does not create lather. Pay special attention to your underarms and from your belly button to your feet. Turn the shower back on and rinse well to get CHG soap off your body. Pat your skin dry with a clean, dry towel. Do not apply lotions or moisturizer. Put on clean clothes and sleep on fresh bed sheets and do not allow pets to sleep with you.     Shower with CHG soap 2 times before your surgery  The evening before your surgery  The morning of your surgery      Tips to help prevent infections after your surgery:  Protect your surgical wound from germs:  Hand washing is the most important thing you and your caregivers can do to prevent infections. Keep your bandage clean and dry! Do not touch your surgical wound. Use clean, freshly washed towels and washcloths every time you shower; do not share bath linens with others. Until your surgical wound is healed, wear clothing and sleep on bed linens each day that are clean and freshly washed. Do not allow pets to sleep in your bed with you or touch your surgical wound. Do not smoke - smoking delays wound healing. This may be a good time to stop smoking. If you have diabetes, it is important for you to manage your blood sugar levels properly before your surgery as well as after your surgery. Poorly managed blood sugar levels slow down wound healing and prevent you from healing completely. Prevention of Infection  Testing for Staphylococcus aureus on your skin before surgery    Staphylococcus aureus (staph) is a common bacteria that is found on the body. It normally does not cause infection on healthy skin. Before surgery, you will be tested to see if you have staph by swabbing the inside of your nose. When you have an incision with surgery, the goal is to protect that incision from infection. Removal of the staph bacteria before surgery can decrease the risk of a surgical site infection. If your nose swab is positive for staph you will be called. Your treatment will include 2 steps:  Prescription for Mupirocin ointment to be used in each nostril twice a day for 5 days. Showering with Chlorhexidine (CHG) liquid soap for 5 days prior to surgery. How to use Mupirocin ointment in your nose   the prescription from your pharmacy.  You will receive a large tube of ointment which will be big enough for all of your treatments. You will apply this ointment to each nostril 2 times a day for 5 days. Wash your hands with  gel or soap and water for 20 seconds before using ointment. Place a pea-sized amount of ointment on a cotton Q-tip. Apply ointment just inside of each nostril with the Q-tip. Do not push Q-tip or ointment deep inside you nose. Press your nostrils together and massage for a few seconds. Wash your hands with  gel or soap and water after you are finished. Do not get ointment near your eyes. If it gets into your eyes, rinse them with cool water. If you need to use nasal spray, clean the tip of the bottle with alcohol before use and do not use both at the same time. If you are scheduled for COVID testing during the 5 days, do NOT apply morning dose until after the COVID test has been performed. How to use Chlorhexidine (CHG) 4% liquid soap  Purchase an 8 ounce bottle of CHG liquid soap (Chlorhexidine 4%, Hibiclens, Hex-A-Clens or store brand) at a pharmacy or grocery store. Wash your hair with your normal shampoo and your body with regular soap and rinse well to remove shampoo and soap from your skin. Wet a clean washcloth and turn off the shower. Put CHG soap on washcloth and apply to your entire body from the neck down. Do not use on your head, face or private parts(genitals). Do not use CHG soap on open sores, wounds or areas of skin irritation. Wash your body gently for 5 minutes. Do not wash your skin too hard. This soap does not create lather. Pay special attention to your underarms and from your belly button to your feet. Turn the shower back on and rinse well to get CHG soap off your body. Pat your skin dry with a clean, dry towel. Do not apply lotions or moisturizer. Put on clean clothes and sleep on fresh bed sheets the night before surgery. Do not allow pets to sleep with you.     Eating and Drinking Before Surgery    You may eat a regular dinner at the usual time on the day before your surgery. Do NOT eat any solid foods after midnight unless your arrival time at the hospital is 3pm or later. You may drink clear liquids only from 12 midnight until 1 hours prior to your arrival time at the hospital on the day of your surgery. Do NOT drink alcohol. Clear liquids include:  Water  Fruit juices without pulp( i.e. apple juice)  Carbonated beverages  Black coffee (no cream/milk)  Tea (no cream/milk)  Gatorade  You may drink up to 12-16 ounces at one time every 4 hours between the hours of midnight and 1 hour before your arrival time at the hospital. Example- if your arrival time at the hospital is 6am, you may drink 12-16 ounces of clear liquids no later than 5am.  If your arrival time at the hospital is 3pm or later, you may eat a light breakfast before 8am.  A light breakfast includes: Toast or bagel (no butter)  Black coffee (no cream/milk)  Tea (no cream/milk)  Fruit juices without pulp ( i.e. apple juice)  Do NOT eat meat, eggs, vegetables or fruit  If you have any questions, please contact your surgeon's office. Patient Information Regarding COVID Restrictions    Day of Procedure    Please park in the parking deck or any designated visitor parking lot. Enter the facility through the Main Entrance of the hospital.  On the day of surgery, please provide the cell phone number of the person who will be waiting for you to the Patient Access representative at the time of registration. Masks are highly recommended in the hospital, but not required. Once your procedure and the immediate recovery period is completed, a nurse in the recovery area will contact your designated visitor to inform them of your room number or to otherwise review other pertinent information regarding your care. Social distancing practices are strongly encouraged in waiting areas and the cafeteria. The patient was contacted in person.    He verbalized understanding of all instructions does not need reinforcement.

## 2023-02-08 NOTE — PERIOP NOTES
COPY OF COVID CARDS PLACED ON CHART. CALL TO PCP, DR. Phoebe Trotter OFFICE/[phone] AND REQ LABS AND EKG DONE IN OFFICE LAST WEEK PER PT.  EKG AND LABS REC'D. EKG NOT SIGNED, BUT IS INTERPRETED AS SINUS RHYTHM. CALLED BACK TO OFFICE TO HAVE THEM FAX OVER A SIGNED COPY. WILL PLACE ON CHART ONCE REC'D. LABS DONE AT PCP: BMP, CBC, PT/INR, A1C AND URINE W/ REFLEX CULTRURE. THESE RESULTS REC'D FROM PCP AND PLACED ON CHART. URINE CULTURE DONE W/ PCP DID SHOW STAPHYLOCOCCUS 10-50,000 COLONIES AND PT WAS PLACED ON DOXYCYCLINE PO BID ON 2/6/23. URINE W/ REFLEX CULTURE REPEATED AT PAT TODAY.

## 2023-02-09 LAB
BACTERIA SPEC CULT: NORMAL
BACTERIA SPEC CULT: NORMAL
SERVICE CMNT-IMP: NORMAL

## 2023-02-09 NOTE — PERIOP NOTES
PAT Nurse Practitioner   Pre-Operative Chart Review/Assessment:-ORTHOPEDIC/NEUROSURGICAL SPINE                Patient Name:  Phani Barber                                                           Age:   67 y.o.    :  1950     Today's Date:  2/10/2023     Date of PAT:   23      Date of Surgery:    2/15/23      Procedure(s):  C4-C5 ACDF     Surgeon:   Dr. Bliss :       1)  PCP: Asha Rubin MD        2)  Cardiac Clearance: EKG and METs reviewed. No further cardiac evaluation requested. EKG from PCP on 23 showed NSR. 3)  Diabetic Treatment Consult: Not indicated. A1c-7.1       4)  Sleep Apnea evaluation:  +JALIL dx. Pt wears CPAP. 5)  Treatment for MRSA/Staph Aureus: +MSSA. Tx w/ mupirocin. 6)  Additional Concerns: HTN, T2DM, CKD(Cr-2.0 on PCP labs)              Vital Signs:         Vitals:    23 1118   BP: (!) 157/79   Pulse: 87   Temp: 98.3 °F (36.8 °C)   Weight: 104 kg (229 lb 4.5 oz)   Height: 5' 10.25\" (1.784 m)          Body mass index is 32.66 kg/m².       ____________________________________________  PAST MEDICAL HISTORY  Past Medical History:   Diagnosis Date    Arthritis     Asthma     Chronic kidney disease     PT ONLY HAS HIS LEFT KIDNEY    Diabetes ([address]     Hypertension     Ill-defined condition     NAION R EYE PARTIALLY BLIND    Sleep apnea     WEARS CPAP    Stroke ([address] 2018    TIA PER PT, VISION ISSUES W/ RT EYE NOW      ____________________________________________  PAST SURGICAL HISTORY  Past Surgical History:   Procedure Laterality Date    HX KNEE REPLACEMENT Right     HX ORTHOPAEDIC Left     MENISCUS REPAIR R    HX ORTHOPAEDIC      ACHILLIS TENDON REPAIR R     HX ORTHOPAEDIC      R WRIST REPAIR FROM FX.     HX ORTHOPAEDIC Bilateral     ROTATOR CUFF REPAIRS    HX ORTHOPAEDIC Left     BICEPS TENDON REPAIR    HX PROSTATE SURGERY  2018    IA UNLISTED PROCEDURE ABDOMEN PERITONEUM & OMENTUM      UMBILICAL HERNIA REPAIR ____________________________________________  HOME MEDICATIONS    Current Outpatient Medications   Medication Sig    hydroCHLOROthiazide (HYDRODIURIL) 25 mg tablet Take 25 mg by mouth two (2) times a day. losartan (COZAAR) 25 mg tablet Take 25 mg by mouth daily. glimepiride (AMARYL) 1 mg tablet Take 1 mg by mouth Daily (before breakfast). doxycycline (MONODOX) 100 mg capsule Take 100 mg by mouth two (2) times a day. thyroid, Pork, (ARMOUR) 60 mg tablet Take 60 mg by mouth daily. finasteride (PROSCAR) 5 mg tablet Take 5 mg by mouth daily. cyanocobalamin (VITAMIN B12) 100 mcg tablet Take 100 mcg by mouth daily. metFORMIN (GLUCOPHAGE) 500 mg tablet Take 500 mg by mouth two (2) times daily (with meals). 1 PILL PO QAM , 2 PO WITH DINNER    terazosin (HYTRIN) 5 mg capsule Take 10 mg by mouth nightly. Indications: SYMPTOMATIC BENIGN PROSTATIC HYPERPLASIA    atorvastatin (LIPITOR) 10 mg tablet Take 10 mg by mouth nightly.      No current facility-administered medications for this encounter.      ____________________________________________  ALLERGIES  Allergies   Allergen Reactions    Shellfish Derived Itching      ____________________________________________  SOCIAL HISTORY  Social History     Tobacco Use    Smoking status: Never    Smokeless tobacco: Never   Substance Use Topics    Alcohol use: Not Currently     Comment: 2 DRINKS A MONTH      ____________________________________________   Internal Administration   First Dose COVID-19, PFIZER PURPLE top, DILUTE for use, (age 15 y+), IM, 30mcg/0.3mL  02/20/2021   Second Dose COVID-19, PFIZER PURPLE top, DILUTE for use, (age 15 y+), IM, 30mcg/0.3mL  03/13/2021      Last COVID Lab No results found for: Melissa Rodriguez, RCV2CT, CVD2M, Nehal Divine, XPLCVT, [id number], [id number], COVV, Reina Meza, [address], [address] [place], [address] [address] [place]               ____________________________________________    Labs:     Hospital Outpatient Visit on 02/08/2023   Component Date Value Ref Range Status    Crossmatch Expiration 02/08/2023 02/18/2023,2359   Final    ABO/Rh(D) 02/08/2023 O NEGATIVE   Final    Antibody screen 02/08/2023 NEG   Final    Color 02/08/2023 YELLOW/STRAW    Final    Color Reference Range: Straw, Yellow or Dark Yellow    Appearance 02/08/2023 CLEAR  CLEAR   Final    Specific gravity 02/08/2023 1.016  1.003 - 1.030   Final    pH (UA) 02/08/2023 5.5  5.0 - 8.0   Final    Protein 02/08/2023 Negative  NEG mg/dL Final    Glucose 02/08/2023 Negative  NEG mg/dL Final    Ketone 02/08/2023 Negative  NEG mg/dL Final    Bilirubin 02/08/2023 Negative  NEG   Final    Blood 02/08/2023 Negative  NEG   Final    Urobilinogen 02/08/2023 0.2  0.2 - 1.0 EU/dL Final    Nitrites 02/08/2023 Negative  NEG   Final    Leukocyte Esterase 02/08/2023 Negative  NEG   Final    WBC 02/08/2023 5-10  0 - 4 /hpf Final    RBC 02/08/2023 0-5  0 - 5 /hpf Final    Epithelial cells 02/08/2023 FEW  FEW /lpf Final    Epithelial cell category consists of squamous cells and /or transitional urothelial cells. Renal tubular cells, if present, are separately identified as such. Bacteria 02/08/2023 Negative  NEG /hpf Final    UA:UC IF INDICATED 02/08/2023 CULTURE NOT INDICATED BY UA RESULT  CNI   Final    Special Requests: 02/08/2023 NO SPECIAL REQUESTS    Final    Culture result: 02/08/2023 MRSA NOT PRESENT. Apparent Staphylococus aureus (not MRSA noted). Final    Culture result: 02/08/2023     Final                    Value:Screening of patient nares for MRSA is for surveillance purposes and, if positive, to facilitate isolation considerations in high risk settings. It is not intended for automatic decolonization interventions per se as regimens are not sufficiently effective to warrant routine use. Skin:     Denies open wounds, cuts, sores, rashes or other areas of concern in PAT assessment.         Wayne Choi NP  Available via [facility]

## 2023-02-10 RX ORDER — MUPIROCIN 20 MG/G
OINTMENT TOPICAL 2 TIMES DAILY
Qty: 22 G | Refills: 0 | Status: SHIPPED | OUTPATIENT
Start: 2023-02-10 | End: 2023-02-15

## 2023-02-10 NOTE — PERIOP NOTES
PC to pt, full name and  verified, regarding positive nasal cx (MSSA) and need to start Mupirocin ointment BID x 5 days to B nostrils starting today and bathe with CHG soap for 5 days prior to surgery. Pt verbalized understanding of instructions and will start today as recommended. Allergies and pharmacy of choice reviewed. Rx escribed to pt's pharmacy of choice. PTA medlist updated. Surgeon and PCP notified of positive culture and treatment. PRESCRIPTION:    MUPIROCIN 2% OINTMENT  QUANTITY:  #22 GRAMS  REFILLS: NONE    Apply 0.25 g (small pea-sized amount) to both nostrils twice a day for five days.     Richar Spring, NP

## 2023-02-15 ENCOUNTER — APPOINTMENT (OUTPATIENT)
Dept: GENERAL RADIOLOGY | Age: 73
End: 2023-02-15
Attending: ORTHOPAEDIC SURGERY
Payer: MEDICARE

## 2023-02-15 ENCOUNTER — HOSPITAL ENCOUNTER (OUTPATIENT)
Age: 73
Discharge: HOME HEALTH | End: 2023-02-17
Attending: ORTHOPAEDIC SURGERY | Admitting: ORTHOPAEDIC SURGERY
Payer: MEDICARE

## 2023-02-15 ENCOUNTER — ANESTHESIA (OUTPATIENT)
Dept: SURGERY | Age: 73
End: 2023-02-15
Payer: MEDICARE

## 2023-02-15 ENCOUNTER — ANESTHESIA EVENT (OUTPATIENT)
Dept: SURGERY | Age: 73
End: 2023-02-15
Payer: MEDICARE

## 2023-02-15 DIAGNOSIS — Z98.1 S/P CERVICAL SPINAL FUSION: Primary | ICD-10-CM

## 2023-02-15 DIAGNOSIS — M48.02 SPINAL STENOSIS OF CERVICAL REGION: ICD-10-CM

## 2023-02-15 PROBLEM — M48.00 SPINAL STENOSIS: Status: ACTIVE | Noted: 2023-02-15

## 2023-02-15 LAB
GLUCOSE BLD STRIP.AUTO-MCNC: 137 MG/DL (ref 65–117)
GLUCOSE BLD STRIP.AUTO-MCNC: 159 MG/DL (ref 65–117)
GLUCOSE BLD STRIP.AUTO-MCNC: 188 MG/DL (ref 65–117)
GLUCOSE BLD STRIP.AUTO-MCNC: 227 MG/DL (ref 65–117)
SERVICE CMNT-IMP: ABNORMAL

## 2023-02-15 PROCEDURE — 74011250636 HC RX REV CODE- 250/636: Performed by: STUDENT IN AN ORGANIZED HEALTH CARE EDUCATION/TRAINING PROGRAM

## 2023-02-15 PROCEDURE — C1713 ANCHOR/SCREW BN/BN,TIS/BN: HCPCS | Performed by: ORTHOPAEDIC SURGERY

## 2023-02-15 PROCEDURE — C1889 IMPLANT/INSERT DEVICE, NOC: HCPCS | Performed by: ORTHOPAEDIC SURGERY

## 2023-02-15 PROCEDURE — 76210000016 HC OR PH I REC 1 TO 1.5 HR: Performed by: ORTHOPAEDIC SURGERY

## 2023-02-15 PROCEDURE — 76060000033 HC ANESTHESIA 1 TO 1.5 HR: Performed by: ORTHOPAEDIC SURGERY

## 2023-02-15 PROCEDURE — 77030014650 HC SEAL MTRX FLOSEL BAXT -C: Performed by: ORTHOPAEDIC SURGERY

## 2023-02-15 PROCEDURE — 2709999900 HC NON-CHARGEABLE SUPPLY: Performed by: ORTHOPAEDIC SURGERY

## 2023-02-15 PROCEDURE — 77030002934 HC SUT MCRYL J&J -B: Performed by: ORTHOPAEDIC SURGERY

## 2023-02-15 PROCEDURE — 51798 US URINE CAPACITY MEASURE: CPT

## 2023-02-15 PROCEDURE — 77030008684 HC TU ET CUF COVD -B: Performed by: ANESTHESIOLOGY

## 2023-02-15 PROCEDURE — 77030034475 HC MISC IMPL SPN: Performed by: ORTHOPAEDIC SURGERY

## 2023-02-15 PROCEDURE — 82962 GLUCOSE BLOOD TEST: CPT

## 2023-02-15 PROCEDURE — 74011000250 HC RX REV CODE- 250: Performed by: NURSE ANESTHETIST, CERTIFIED REGISTERED

## 2023-02-15 PROCEDURE — 74011250636 HC RX REV CODE- 250/636: Performed by: ANESTHESIOLOGY

## 2023-02-15 PROCEDURE — 77030026438 HC STYL ET INTUB CARD -A: Performed by: ANESTHESIOLOGY

## 2023-02-15 PROCEDURE — 74011636637 HC RX REV CODE- 636/637: Performed by: STUDENT IN AN ORGANIZED HEALTH CARE EDUCATION/TRAINING PROGRAM

## 2023-02-15 PROCEDURE — 74011000250 HC RX REV CODE- 250: Performed by: STUDENT IN AN ORGANIZED HEALTH CARE EDUCATION/TRAINING PROGRAM

## 2023-02-15 PROCEDURE — 74011250636 HC RX REV CODE- 250/636: Performed by: NURSE ANESTHETIST, CERTIFIED REGISTERED

## 2023-02-15 PROCEDURE — 76010000161 HC OR TIME 1 TO 1.5 HR INTENSV-TIER 1: Performed by: ORTHOPAEDIC SURGERY

## 2023-02-15 PROCEDURE — 77030040506 HC DRN WND MDII -A: Performed by: ORTHOPAEDIC SURGERY

## 2023-02-15 PROCEDURE — 77030038600 HC TU BPLR IRR DISP STRY -B: Performed by: ORTHOPAEDIC SURGERY

## 2023-02-15 PROCEDURE — 74011250637 HC RX REV CODE- 250/637: Performed by: ORTHOPAEDIC SURGERY

## 2023-02-15 PROCEDURE — 77030035236 HC SUT PDS STRATFX BARB J&J -B: Performed by: ORTHOPAEDIC SURGERY

## 2023-02-15 PROCEDURE — 74011000250 HC RX REV CODE- 250: Performed by: ORTHOPAEDIC SURGERY

## 2023-02-15 PROCEDURE — 74011250637 HC RX REV CODE- 250/637: Performed by: STUDENT IN AN ORGANIZED HEALTH CARE EDUCATION/TRAINING PROGRAM

## 2023-02-15 PROCEDURE — 77030029099 HC BN WAX SSPC -A: Performed by: ORTHOPAEDIC SURGERY

## 2023-02-15 PROCEDURE — 77030003832 HC BIT DRL ATLNTS MEDT -B: Performed by: ORTHOPAEDIC SURGERY

## 2023-02-15 PROCEDURE — 77030004391 HC BUR FLUT MEDT -C: Performed by: ORTHOPAEDIC SURGERY

## 2023-02-15 PROCEDURE — 72020 X-RAY EXAM OF SPINE 1 VIEW: CPT

## 2023-02-15 DEVICE — SCREW 3120517 4.0 X 17 SELF DRILL VAR
Type: IMPLANTABLE DEVICE | Site: SPINE CERVICAL | Status: FUNCTIONAL
Brand: ATLANTIS® ANTERIOR CERVICAL PLATE SYSTEM

## 2023-02-15 DEVICE — INTERBODY FUSION DEVICE  6 DEGREE MEDIUM 8MM
Type: IMPLANTABLE DEVICE | Site: SPINE CERVICAL | Status: FUNCTIONAL
Brand: ENDOSKELETON® TC NANOLOCK® SURFACE TECHNOLOGY

## 2023-02-15 DEVICE — GRAFT HUM TISS 3X4 CM WND COVERING AMNIO MEMBRN VERSASHIELD: Type: IMPLANTABLE DEVICE | Site: SPINE CERVICAL | Status: FUNCTIONAL

## 2023-02-15 DEVICE — GRAFT BNE SUB SM CANC FRZN MORSELIZED W/ VIABLE CELL: Type: IMPLANTABLE DEVICE | Site: SPINE CERVICAL | Status: FUNCTIONAL

## 2023-02-15 RX ORDER — DOXYCYCLINE HYCLATE 100 MG
100 TABLET ORAL 2 TIMES DAILY
Status: DISCONTINUED | OUTPATIENT
Start: 2023-02-15 | End: 2023-02-17 | Stop reason: HOSPADM

## 2023-02-15 RX ORDER — FENTANYL CITRATE 50 UG/ML
50 INJECTION, SOLUTION INTRAMUSCULAR; INTRAVENOUS AS NEEDED
Status: DISCONTINUED | OUTPATIENT
Start: 2023-02-15 | End: 2023-02-15 | Stop reason: HOSPADM

## 2023-02-15 RX ORDER — ONDANSETRON 2 MG/ML
4 INJECTION INTRAMUSCULAR; INTRAVENOUS AS NEEDED
Status: DISCONTINUED | OUTPATIENT
Start: 2023-02-15 | End: 2023-02-15 | Stop reason: HOSPADM

## 2023-02-15 RX ORDER — HYDROMORPHONE HYDROCHLORIDE 1 MG/ML
0.5 INJECTION, SOLUTION INTRAMUSCULAR; INTRAVENOUS; SUBCUTANEOUS
Status: ACTIVE | OUTPATIENT
Start: 2023-02-15 | End: 2023-02-16

## 2023-02-15 RX ORDER — HYDROMORPHONE HYDROCHLORIDE 2 MG/ML
INJECTION, SOLUTION INTRAMUSCULAR; INTRAVENOUS; SUBCUTANEOUS AS NEEDED
Status: DISCONTINUED | OUTPATIENT
Start: 2023-02-15 | End: 2023-02-15 | Stop reason: HOSPADM

## 2023-02-15 RX ORDER — DOXAZOSIN 2 MG/1
10 TABLET ORAL
Status: DISCONTINUED | OUTPATIENT
Start: 2023-02-15 | End: 2023-02-15 | Stop reason: SDUPTHER

## 2023-02-15 RX ORDER — SODIUM CHLORIDE 0.9 % (FLUSH) 0.9 %
5-40 SYRINGE (ML) INJECTION AS NEEDED
Status: DISCONTINUED | OUTPATIENT
Start: 2023-02-15 | End: 2023-02-17 | Stop reason: HOSPADM

## 2023-02-15 RX ORDER — NALOXONE HYDROCHLORIDE 4 MG/.1ML
SPRAY NASAL
Qty: 1 EACH | Refills: 0 | Status: SHIPPED | OUTPATIENT
Start: 2023-02-15

## 2023-02-15 RX ORDER — MIDAZOLAM HYDROCHLORIDE 1 MG/ML
1 INJECTION, SOLUTION INTRAMUSCULAR; INTRAVENOUS AS NEEDED
Status: DISCONTINUED | OUTPATIENT
Start: 2023-02-15 | End: 2023-02-15 | Stop reason: HOSPADM

## 2023-02-15 RX ORDER — LEVOTHYROXINE AND LIOTHYRONINE 38; 9 UG/1; UG/1
60 TABLET ORAL DAILY
Status: DISCONTINUED | OUTPATIENT
Start: 2023-02-16 | End: 2023-02-17 | Stop reason: HOSPADM

## 2023-02-15 RX ORDER — HYDROMORPHONE HYDROCHLORIDE 1 MG/ML
0.2 INJECTION, SOLUTION INTRAMUSCULAR; INTRAVENOUS; SUBCUTANEOUS
Status: DISCONTINUED | OUTPATIENT
Start: 2023-02-15 | End: 2023-02-15 | Stop reason: HOSPADM

## 2023-02-15 RX ORDER — SODIUM CHLORIDE 9 MG/ML
50 INJECTION, SOLUTION INTRAVENOUS CONTINUOUS
Status: DISCONTINUED | OUTPATIENT
Start: 2023-02-15 | End: 2023-02-15 | Stop reason: HOSPADM

## 2023-02-15 RX ORDER — ESMOLOL HYDROCHLORIDE 10 MG/ML
INJECTION INTRAVENOUS AS NEEDED
Status: DISCONTINUED | OUTPATIENT
Start: 2023-02-15 | End: 2023-02-15 | Stop reason: HOSPADM

## 2023-02-15 RX ORDER — FACIAL-BODY WIPES
10 EACH TOPICAL DAILY PRN
Status: DISCONTINUED | OUTPATIENT
Start: 2023-02-17 | End: 2023-02-17 | Stop reason: HOSPADM

## 2023-02-15 RX ORDER — POLYETHYLENE GLYCOL 3350 17 G/17G
17 POWDER, FOR SOLUTION ORAL DAILY
Status: DISCONTINUED | OUTPATIENT
Start: 2023-02-16 | End: 2023-02-17 | Stop reason: HOSPADM

## 2023-02-15 RX ORDER — SODIUM CHLORIDE 0.9 % (FLUSH) 0.9 %
5-40 SYRINGE (ML) INJECTION EVERY 8 HOURS
Status: DISCONTINUED | OUTPATIENT
Start: 2023-02-15 | End: 2023-02-15 | Stop reason: HOSPADM

## 2023-02-15 RX ORDER — FENTANYL CITRATE 50 UG/ML
INJECTION, SOLUTION INTRAMUSCULAR; INTRAVENOUS AS NEEDED
Status: DISCONTINUED | OUTPATIENT
Start: 2023-02-15 | End: 2023-02-15 | Stop reason: HOSPADM

## 2023-02-15 RX ORDER — SODIUM CHLORIDE 0.9 % (FLUSH) 0.9 %
5-40 SYRINGE (ML) INJECTION EVERY 8 HOURS
Status: DISCONTINUED | OUTPATIENT
Start: 2023-02-15 | End: 2023-02-17 | Stop reason: HOSPADM

## 2023-02-15 RX ORDER — AMOXICILLIN 250 MG
1 CAPSULE ORAL 2 TIMES DAILY
Status: DISCONTINUED | OUTPATIENT
Start: 2023-02-15 | End: 2023-02-17 | Stop reason: HOSPADM

## 2023-02-15 RX ORDER — SODIUM CHLORIDE 9 MG/ML
125 INJECTION, SOLUTION INTRAVENOUS CONTINUOUS
Status: DISPENSED | OUTPATIENT
Start: 2023-02-15 | End: 2023-02-16

## 2023-02-15 RX ORDER — TERAZOSIN 5 MG/1
10 CAPSULE ORAL
Status: DISCONTINUED | OUTPATIENT
Start: 2023-02-15 | End: 2023-02-17 | Stop reason: HOSPADM

## 2023-02-15 RX ORDER — ROCURONIUM BROMIDE 10 MG/ML
INJECTION, SOLUTION INTRAVENOUS AS NEEDED
Status: DISCONTINUED | OUTPATIENT
Start: 2023-02-15 | End: 2023-02-15 | Stop reason: HOSPADM

## 2023-02-15 RX ORDER — UREA 10 %
100 LOTION (ML) TOPICAL DAILY
Status: DISCONTINUED | OUTPATIENT
Start: 2023-02-16 | End: 2023-02-17 | Stop reason: HOSPADM

## 2023-02-15 RX ORDER — LIDOCAINE HYDROCHLORIDE 10 MG/ML
0.1 INJECTION, SOLUTION EPIDURAL; INFILTRATION; INTRACAUDAL; PERINEURAL AS NEEDED
Status: DISCONTINUED | OUTPATIENT
Start: 2023-02-15 | End: 2023-02-15 | Stop reason: HOSPADM

## 2023-02-15 RX ORDER — MIDAZOLAM HYDROCHLORIDE 1 MG/ML
0.5 INJECTION, SOLUTION INTRAMUSCULAR; INTRAVENOUS
Status: DISCONTINUED | OUTPATIENT
Start: 2023-02-15 | End: 2023-02-15 | Stop reason: HOSPADM

## 2023-02-15 RX ORDER — GLIPIZIDE 5 MG/1
2.5 TABLET ORAL
Status: DISCONTINUED | OUTPATIENT
Start: 2023-02-16 | End: 2023-02-17 | Stop reason: HOSPADM

## 2023-02-15 RX ORDER — SODIUM CHLORIDE, SODIUM LACTATE, POTASSIUM CHLORIDE, CALCIUM CHLORIDE 600; 310; 30; 20 MG/100ML; MG/100ML; MG/100ML; MG/100ML
75 INJECTION, SOLUTION INTRAVENOUS CONTINUOUS
Status: DISCONTINUED | OUTPATIENT
Start: 2023-02-15 | End: 2023-02-15 | Stop reason: HOSPADM

## 2023-02-15 RX ORDER — OXYCODONE HYDROCHLORIDE 5 MG/1
10 TABLET ORAL
Status: DISCONTINUED | OUTPATIENT
Start: 2023-02-15 | End: 2023-02-17 | Stop reason: HOSPADM

## 2023-02-15 RX ORDER — IBUPROFEN 200 MG
4 TABLET ORAL AS NEEDED
Status: DISCONTINUED | OUTPATIENT
Start: 2023-02-15 | End: 2023-02-17 | Stop reason: HOSPADM

## 2023-02-15 RX ORDER — FINASTERIDE 5 MG/1
5 TABLET, FILM COATED ORAL DAILY
Status: DISCONTINUED | OUTPATIENT
Start: 2023-02-16 | End: 2023-02-17 | Stop reason: HOSPADM

## 2023-02-15 RX ORDER — LOSARTAN POTASSIUM 25 MG/1
25 TABLET ORAL ONCE
Status: COMPLETED | OUTPATIENT
Start: 2023-02-15 | End: 2023-02-15

## 2023-02-15 RX ORDER — MIDAZOLAM HYDROCHLORIDE 1 MG/ML
INJECTION, SOLUTION INTRAMUSCULAR; INTRAVENOUS AS NEEDED
Status: DISCONTINUED | OUTPATIENT
Start: 2023-02-15 | End: 2023-02-15 | Stop reason: HOSPADM

## 2023-02-15 RX ORDER — SODIUM CHLORIDE, SODIUM LACTATE, POTASSIUM CHLORIDE, CALCIUM CHLORIDE 600; 310; 30; 20 MG/100ML; MG/100ML; MG/100ML; MG/100ML
INJECTION, SOLUTION INTRAVENOUS
Status: DISCONTINUED | OUTPATIENT
Start: 2023-02-15 | End: 2023-02-15 | Stop reason: HOSPADM

## 2023-02-15 RX ORDER — ONDANSETRON 2 MG/ML
4 INJECTION INTRAMUSCULAR; INTRAVENOUS
Status: ACTIVE | OUTPATIENT
Start: 2023-02-15 | End: 2023-02-16

## 2023-02-15 RX ORDER — OXYCODONE HYDROCHLORIDE 5 MG/1
5 TABLET ORAL
Status: DISCONTINUED | OUTPATIENT
Start: 2023-02-15 | End: 2023-02-17 | Stop reason: HOSPADM

## 2023-02-15 RX ORDER — SODIUM CHLORIDE 0.9 % (FLUSH) 0.9 %
5-40 SYRINGE (ML) INJECTION AS NEEDED
Status: DISCONTINUED | OUTPATIENT
Start: 2023-02-15 | End: 2023-02-15 | Stop reason: HOSPADM

## 2023-02-15 RX ORDER — METFORMIN HYDROCHLORIDE 500 MG/1
500 TABLET ORAL 2 TIMES DAILY WITH MEALS
Status: DISCONTINUED | OUTPATIENT
Start: 2023-02-15 | End: 2023-02-17 | Stop reason: HOSPADM

## 2023-02-15 RX ORDER — ACETAMINOPHEN 500 MG
1000 TABLET ORAL EVERY 6 HOURS
Status: DISCONTINUED | OUTPATIENT
Start: 2023-02-15 | End: 2023-02-17 | Stop reason: HOSPADM

## 2023-02-15 RX ORDER — FENTANYL CITRATE 50 UG/ML
25 INJECTION, SOLUTION INTRAMUSCULAR; INTRAVENOUS
Status: COMPLETED | OUTPATIENT
Start: 2023-02-15 | End: 2023-02-15

## 2023-02-15 RX ORDER — LOSARTAN POTASSIUM 25 MG/1
25 TABLET ORAL DAILY
Status: DISCONTINUED | OUTPATIENT
Start: 2023-02-16 | End: 2023-02-17 | Stop reason: HOSPADM

## 2023-02-15 RX ORDER — NALOXONE HYDROCHLORIDE 0.4 MG/ML
0.4 INJECTION, SOLUTION INTRAMUSCULAR; INTRAVENOUS; SUBCUTANEOUS AS NEEDED
Status: DISCONTINUED | OUTPATIENT
Start: 2023-02-15 | End: 2023-02-17 | Stop reason: HOSPADM

## 2023-02-15 RX ORDER — SUCCINYLCHOLINE CHLORIDE 20 MG/ML
INJECTION INTRAMUSCULAR; INTRAVENOUS AS NEEDED
Status: DISCONTINUED | OUTPATIENT
Start: 2023-02-15 | End: 2023-02-15 | Stop reason: HOSPADM

## 2023-02-15 RX ORDER — INSULIN LISPRO 100 [IU]/ML
INJECTION, SOLUTION INTRAVENOUS; SUBCUTANEOUS
Status: DISCONTINUED | OUTPATIENT
Start: 2023-02-15 | End: 2023-02-17 | Stop reason: HOSPADM

## 2023-02-15 RX ORDER — DIPHENHYDRAMINE HYDROCHLORIDE 50 MG/ML
12.5 INJECTION, SOLUTION INTRAMUSCULAR; INTRAVENOUS AS NEEDED
Status: DISCONTINUED | OUTPATIENT
Start: 2023-02-15 | End: 2023-02-15 | Stop reason: HOSPADM

## 2023-02-15 RX ORDER — ONDANSETRON 2 MG/ML
INJECTION INTRAMUSCULAR; INTRAVENOUS AS NEEDED
Status: DISCONTINUED | OUTPATIENT
Start: 2023-02-15 | End: 2023-02-15 | Stop reason: HOSPADM

## 2023-02-15 RX ORDER — HYDROCHLOROTHIAZIDE 25 MG/1
25 TABLET ORAL 2 TIMES DAILY
Status: DISCONTINUED | OUTPATIENT
Start: 2023-02-15 | End: 2023-02-17 | Stop reason: HOSPADM

## 2023-02-15 RX ORDER — ATORVASTATIN CALCIUM 10 MG/1
10 TABLET, FILM COATED ORAL
Status: DISCONTINUED | OUTPATIENT
Start: 2023-02-15 | End: 2023-02-17 | Stop reason: HOSPADM

## 2023-02-15 RX ORDER — DIPHENHYDRAMINE HYDROCHLORIDE 50 MG/ML
12.5 INJECTION, SOLUTION INTRAMUSCULAR; INTRAVENOUS
Status: ACTIVE | OUTPATIENT
Start: 2023-02-15 | End: 2023-02-16

## 2023-02-15 RX ORDER — PROPOFOL 10 MG/ML
INJECTION, EMULSION INTRAVENOUS AS NEEDED
Status: DISCONTINUED | OUTPATIENT
Start: 2023-02-15 | End: 2023-02-15 | Stop reason: HOSPADM

## 2023-02-15 RX ORDER — OXYCODONE AND ACETAMINOPHEN 5; 325 MG/1; MG/1
1 TABLET ORAL AS NEEDED
Status: DISCONTINUED | OUTPATIENT
Start: 2023-02-15 | End: 2023-02-15 | Stop reason: HOSPADM

## 2023-02-15 RX ORDER — LIDOCAINE HYDROCHLORIDE 20 MG/ML
INJECTION, SOLUTION EPIDURAL; INFILTRATION; INTRACAUDAL; PERINEURAL AS NEEDED
Status: DISCONTINUED | OUTPATIENT
Start: 2023-02-15 | End: 2023-02-15 | Stop reason: HOSPADM

## 2023-02-15 RX ORDER — PROPOFOL 10 MG/ML
INJECTION, EMULSION INTRAVENOUS
Status: DISCONTINUED | OUTPATIENT
Start: 2023-02-15 | End: 2023-02-15 | Stop reason: HOSPADM

## 2023-02-15 RX ORDER — FENTANYL CITRATE 50 UG/ML
INJECTION, SOLUTION INTRAMUSCULAR; INTRAVENOUS
Status: DISPENSED
Start: 2023-02-15 | End: 2023-02-16

## 2023-02-15 RX ORDER — MORPHINE SULFATE 2 MG/ML
2 INJECTION, SOLUTION INTRAMUSCULAR; INTRAVENOUS
Status: DISCONTINUED | OUTPATIENT
Start: 2023-02-15 | End: 2023-02-15 | Stop reason: HOSPADM

## 2023-02-15 RX ORDER — OXYCODONE HYDROCHLORIDE 5 MG/1
5-10 TABLET ORAL
Qty: 60 TABLET | Refills: 0 | Status: SHIPPED | OUTPATIENT
Start: 2023-02-15 | End: 2023-02-20

## 2023-02-15 RX ADMIN — SODIUM CHLORIDE, PRESERVATIVE FREE 10 ML: 5 INJECTION INTRAVENOUS at 22:14

## 2023-02-15 RX ADMIN — MEPERIDINE HYDROCHLORIDE 12.5 MG: 25 INJECTION, SOLUTION INTRAMUSCULAR; INTRAVENOUS; SUBCUTANEOUS at 12:45

## 2023-02-15 RX ADMIN — SUCCINYLCHOLINE CHLORIDE 200 MG: 20 INJECTION, SOLUTION INTRAMUSCULAR; INTRAVENOUS at 10:47

## 2023-02-15 RX ADMIN — CEFAZOLIN 2 G: 1 INJECTION, POWDER, FOR SOLUTION INTRAMUSCULAR; INTRAVENOUS at 21:56

## 2023-02-15 RX ADMIN — DOXYCYCLINE HYCLATE 100 MG: 100 TABLET, COATED ORAL at 18:00

## 2023-02-15 RX ADMIN — ESMOLOL HYDROCHLORIDE 10 MG: 10 INJECTION, SOLUTION INTRAVENOUS at 11:24

## 2023-02-15 RX ADMIN — ONDANSETRON HYDROCHLORIDE 4 MG: 2 INJECTION, SOLUTION INTRAMUSCULAR; INTRAVENOUS at 11:48

## 2023-02-15 RX ADMIN — ACETAMINOPHEN 1000 MG: 500 TABLET ORAL at 15:02

## 2023-02-15 RX ADMIN — ACETAMINOPHEN 1000 MG: 500 TABLET ORAL at 17:46

## 2023-02-15 RX ADMIN — FENTANYL CITRATE 25 MCG: 50 INJECTION INTRAMUSCULAR; INTRAVENOUS at 13:15

## 2023-02-15 RX ADMIN — MIDAZOLAM 2 MG: 1 INJECTION INTRAMUSCULAR; INTRAVENOUS at 10:41

## 2023-02-15 RX ADMIN — OXYCODONE HYDROCHLORIDE 10 MG: 5 TABLET ORAL at 18:00

## 2023-02-15 RX ADMIN — SODIUM CHLORIDE, POTASSIUM CHLORIDE, SODIUM LACTATE AND CALCIUM CHLORIDE: 600; 310; 30; 20 INJECTION, SOLUTION INTRAVENOUS at 10:41

## 2023-02-15 RX ADMIN — PROPOFOL 50 MG: 10 INJECTION, EMULSION INTRAVENOUS at 11:03

## 2023-02-15 RX ADMIN — OXYCODONE HYDROCHLORIDE 10 MG: 5 TABLET ORAL at 15:02

## 2023-02-15 RX ADMIN — METFORMIN HYDROCHLORIDE 500 MG: 500 TABLET ORAL at 16:24

## 2023-02-15 RX ADMIN — LOSARTAN POTASSIUM 25 MG: 25 TABLET, FILM COATED ORAL at 16:24

## 2023-02-15 RX ADMIN — Medication 3 UNITS: at 16:24

## 2023-02-15 RX ADMIN — FENTANYL CITRATE 25 MCG: 50 INJECTION INTRAMUSCULAR; INTRAVENOUS at 12:45

## 2023-02-15 RX ADMIN — SODIUM CHLORIDE, PRESERVATIVE FREE 10 ML: 5 INJECTION INTRAVENOUS at 15:08

## 2023-02-15 RX ADMIN — PROPOFOL 150 MG: 10 INJECTION, EMULSION INTRAVENOUS at 10:47

## 2023-02-15 RX ADMIN — SODIUM CHLORIDE 125 ML/HR: 9 INJECTION, SOLUTION INTRAVENOUS at 13:00

## 2023-02-15 RX ADMIN — WATER 2 G: 1 INJECTION INTRAMUSCULAR; INTRAVENOUS; SUBCUTANEOUS at 11:00

## 2023-02-15 RX ADMIN — MEPERIDINE HYDROCHLORIDE 12.5 MG: 25 INJECTION, SOLUTION INTRAMUSCULAR; INTRAVENOUS; SUBCUTANEOUS at 12:36

## 2023-02-15 RX ADMIN — OXYCODONE HYDROCHLORIDE 10 MG: 5 TABLET ORAL at 22:07

## 2023-02-15 RX ADMIN — ROCURONIUM BROMIDE 5 MG: 10 SOLUTION INTRAVENOUS at 10:47

## 2023-02-15 RX ADMIN — HYDROMORPHONE HYDROCHLORIDE 0.5 MG: 2 INJECTION, SOLUTION INTRAMUSCULAR; INTRAVENOUS; SUBCUTANEOUS at 11:04

## 2023-02-15 RX ADMIN — FENTANYL CITRATE 50 MCG: 50 INJECTION, SOLUTION INTRAMUSCULAR; INTRAVENOUS at 11:12

## 2023-02-15 RX ADMIN — SENNOSIDES AND DOCUSATE SODIUM 1 TABLET: 50; 8.6 TABLET ORAL at 17:46

## 2023-02-15 RX ADMIN — FENTANYL CITRATE 50 MCG: 50 INJECTION, SOLUTION INTRAMUSCULAR; INTRAVENOUS at 10:47

## 2023-02-15 RX ADMIN — FENTANYL CITRATE 25 MCG: 50 INJECTION INTRAMUSCULAR; INTRAVENOUS at 12:36

## 2023-02-15 RX ADMIN — FENTANYL CITRATE 25 MCG: 50 INJECTION INTRAMUSCULAR; INTRAVENOUS at 13:00

## 2023-02-15 RX ADMIN — CEFAZOLIN 2 G: 1 INJECTION, POWDER, FOR SOLUTION INTRAMUSCULAR; INTRAVENOUS at 15:02

## 2023-02-15 RX ADMIN — SODIUM CHLORIDE, POTASSIUM CHLORIDE, SODIUM LACTATE AND CALCIUM CHLORIDE 75 ML/HR: 600; 310; 30; 20 INJECTION, SOLUTION INTRAVENOUS at 10:17

## 2023-02-15 RX ADMIN — HYDROCHLOROTHIAZIDE 25 MG: 25 TABLET ORAL at 17:46

## 2023-02-15 RX ADMIN — TERAZOSIN 10 MG: 5 CAPSULE ORAL at 21:57

## 2023-02-15 RX ADMIN — LIDOCAINE HYDROCHLORIDE 70 MG: 20 INJECTION, SOLUTION EPIDURAL; INFILTRATION; INTRACAUDAL; PERINEURAL at 10:47

## 2023-02-15 RX ADMIN — ATORVASTATIN CALCIUM 10 MG: 10 TABLET, FILM COATED ORAL at 21:56

## 2023-02-15 RX ADMIN — PROPOFOL 100 MCG/KG/MIN: 10 INJECTION, EMULSION INTRAVENOUS at 10:51

## 2023-02-15 NOTE — PROGRESS NOTES
Medicare Outpatient Observation Notice (MOON)/ provided to patient/representative with verbal explanation of the notice. Time allotted for questions regarding the notice. Patient /representative provided a completed copy of the MOON/notice. Copy placed on bedside chart.

## 2023-02-15 NOTE — ANESTHESIA POSTPROCEDURE EVALUATION
Procedure(s):  C4-5 ANTERIOR CERVICAL DISCECTOMY WITH FUSION. general    Anesthesia Post Evaluation      Multimodal analgesia: multimodal analgesia used between 6 hours prior to anesthesia start to PACU discharge  Patient location during evaluation: PACU  Patient participation: complete - patient participated  Level of consciousness: awake  Pain management: adequate  Airway patency: patent  Anesthetic complications: no  Cardiovascular status: acceptable  Respiratory status: acceptable  Hydration status: acceptable  Post anesthesia nausea and vomiting:  none  Final Post Anesthesia Temperature Assessment:  Normothermia (36.0-37.5 degrees C)      INITIAL Post-op Vital signs:   Vitals Value Taken Time   /86 02/15/23 1315   Temp 36.4 °C (97.6 °F) 02/15/23 1207   Pulse 97 02/15/23 1327   Resp 13 02/15/23 1327   SpO2 93 % 02/15/23 1327   Vitals shown include unvalidated device data.

## 2023-02-15 NOTE — PERIOP NOTES
Floseal Hemostatic Matrix, 5 ml used during the procedure  Ref # [id number]  Lot # [id number]  Exp. Date 9/29/2024    Surgifoam Absorbable Gelatin Sponge Size 12-7 used during the procedure  Ref # [id number]  Lot # [id number]  Exp.  Date 12/07/2025

## 2023-02-15 NOTE — ANESTHESIA PREPROCEDURE EVALUATION
Anesthetic History   No history of anesthetic complications            Review of Systems / Medical History  Patient summary reviewed, nursing notes reviewed and pertinent labs reviewed    Pulmonary        Sleep apnea: CPAP    Asthma        Neuro/Psych       CVA      Comments: VISION ISSUES W/ RT EYE NOW  Spinal stenosis Cardiovascular    Hypertension              Exercise tolerance: >4 METS     GI/Hepatic/Renal         Renal disease: CRI      Comments: single kidney  Endo/Other    Diabetes: type 2    Obesity and arthritis     Other Findings              Physical Exam    Airway  Mallampati: II  TM Distance: 4 - 6 cm  Neck ROM: normal range of motion, short neck   Mouth opening: Normal     Cardiovascular  Regular rate and rhythm,  S1 and S2 normal,  no murmur, click, rub, or gallop  Rhythm: regular  Rate: normal         Dental  No notable dental hx       Pulmonary  Breath sounds clear to auscultation               Abdominal  GI exam deferred       Other Findings            Anesthetic Plan    ASA: 3  Anesthesia type: general          Induction: Intravenous  Anesthetic plan and risks discussed with: Patient

## 2023-02-15 NOTE — PERIOP NOTES
TRANSFER - OUT REPORT:    Verbal report given to [address] on Celestine Frye  being transferred to room 548 for routine post - op       Report consisted of patients Situation, Background, Assessment and   Recommendations(SBAR). Time Pre op antibiotic given:  1100  Anesthesia Stop time:  6614  Ventura Present on Transfer to floor: NO  Order for Ventura on Chart: NO,    Information from the following report(s) SBAR and MAR was reviewed with the receiving nurse. Opportunity for questions and clarification was provided. Is the patient on 02? YES       L/Min 2       Other     Is the patient on a monitor? NO    Is the nurse transporting with the patient? NO    Surgical Waiting Area notified of patient's transfer from PACU? YES    Lines:   Peripheral IV 02/15/23 Anterior; Left Hand (Active)   Site Assessment Clean, dry, & intact 02/15/23 1210   Phlebitis Assessment 0 02/15/23 1300   Infiltration Assessment 0 02/15/23 1300   Dressing Status Clean, dry, & intact 02/15/23 1210   Dressing Type Transparent 02/15/23 1210   Hub Color/Line Status Infusing 02/15/23 1300        The following personal items collected during your admission accompanied patient upon transfer:   Dental Appliance: Dental Appliances: None  Vision:    Hearing Aid:    Jewelry:    Clothing:    Other Valuables:    Valuables sent to safe:        VS stable. Awake and alert. Resting comfortably. Patient denies nausea. Pain improved. No signs of excessive bleeding. Ready to transfer from PACU.

## 2023-02-15 NOTE — PROGRESS NOTES
02/15/23 1127   Family Communication   Family Update Message Surgeon working   Delivery Origin Nurse    Relationship to Patient Spouse    Phone Number Leslie Taya [phone]   Family/Significant Other Update Called

## 2023-02-15 NOTE — DISCHARGE INSTRUCTIONS
After Hospital Care Plan:  Discharge Instructions Cervical (Neck) Spine Surgery Dr. Vinny Khan    Patient Name: Luis Kitchen    Date of procedure: 2/15/2023  Date of discharge: 2/16/2023    Procedure: Procedure(s):  C4-5 ANTERIOR CERVICAL DISCECTOMY WITH FUSION      Follow up appointments   -follow up with Dr. Vinny Khan in 2 weeks. Call [phone] to make an appointment as soon as you get home from the hospital.    When to call your Orthopaedic Surgeon:  -Difficulty swallowing that is worse than when you left the hospital.  -Signs of infection-if your incision is red; continues to have drainage; drainage has a foul odor or if you have a persistent fever over 101 degrees for 24 hours  -nausea or vomiting, severe headache  -changes in sensation in your arms or legs (numbness, tingling, loss of color)  -increased weakness-greater than before your surgery  -severe pain or pain not relieved by medications  -Signs of a blood clot in your leg-calf pain, tenderness, redness, swelling of lower leg    When to call your Primary Care Physician:  -Concerns about medical conditions such as diabetes, high blood pressure, asthma, congestive heart failure  -Call if blood sugars are elevated, persistent headache or dizziness, coughing or congestion, constipation or diarrhea, burning with urination, abnormal heart rate    When to call 911 and go to the nearest emergency room:  -acute onset of chest pain, shortness of breath, difficulty breathing    Activity  - You are going home a well person, be as active as possible. Your only exercise should be walking. Start with short frequent walks and increase your walking distance each day.  -Limit the amount of time you sit to 20-30 minute intervals. Sitting for prolonged periods of time will be uncomfortable for you following surgery.   - Do NOT lift anything over 5 pounds  -From now on, even when lifting light weight, bend with your knees and not your back.  -Do NOT do any neck exercises until you have been instructed by your doctor  -When you are in bed, you may lay on your back or on either side. Do NOT lie on your stomach    Cervical Collar (Aspen Collar)  -You are required to wear your cervical collar at all times; except when showering. You may remove the collar long enough to change the pads when needed and to change your dressing each day. -Do not bend or twist when your collar is off. It is best to have someone assist you when changing the pads and your dressing to prevent you from bending your neck. - Clean the pads on your neck collar every day by hand washing with a mild soap and water. Pat them dry with a towel and lay out to air dry. Do not use heat to dry the pads. Driving  -You may not drive or return to work until instructed by your physician. However, you may ride in the car for short periods of time. Incision Care  Your incision has been closed with absorbable sutures and steri-strips. This will assist with healing. Steri-strips are to remain on your incision for 2 weeks. A dry dressing (ABD and tape) will be placed over it and should be changed daily, for at least the first several days after your surgery. If you have no incisional drainage, you may leave the incision open to air if you wish, still leaving the steri-strips in place. Please make sure to wash your hands prior to touching your dressing. You may take brief showers but do not run the water directly onto the wound. After your shower, blot your incision dry with a soft towel and replace the dry dressing. Do not allow the tape to come in contact with the steri-strips. Do not rub or apply any lotions or ointments to your incision site. Do not soak or scrub your wound. Your steri-strips will be removed during your two week follow-up appointment.  If you experience drainage leaking from underneath the steri-strips or if it peels off before 2 weeks, please contact your orthopedic surgeons office. Showering  -You may shower in approximately 2 days after your surgery.    -Leave the dressing on during your shower. Do NOT allow the water to run directly onto your dressing. Once you get out of the shower, put on a dry dressing.  -Reminder- Make sure you put clean pads on your collar after your shower.    -Do not take a tub bath. Preventing blood clots  -You have been given T.E.D. stockings to wear. Continue to wear these for 7 days after your discharge. Put them on in the morning and take them off at night.    -They are used to increase your circulation and prevent blood clots from forming in your legs  -T. E.D. stockings can be machine washed, temperature not to exceed 160° F (71°C) and machine dried for 15 to 20 minutes, temperature not to exceed 250° F (121°C). Pain management  -Take pain medication as prescribed; decrease the amount you use as your pain lessens  -Do not wait until you are in extreme pain to take your medication.  -Avoid alcoholic beverages while taking pain medication    Pain Medication Safety  DO:  -Read the Medication Guide   -Take your medicine exactly as prescribed   -Store your medicine away from children and in a safe place   -Flush unused medicine down the toilet   -Call your healthcare provider for medical advice about side effects. You may report side effects to FDA at 7-798-FDA-8951.   -Please be aware that many medications contain Tylenol. We do not want you to over medicate so please read the information below as a guide. Do not take more than 4 Grams of Tylenol in a 24 hour period.   (There are 1000 milligrams in one Gram)                                                                                                                                                                                                    Percocet contains 325 mg of Tylenol per tablet (do not take more than 12 tablets in 24 hours)  Lortab contains 500 mg of Tylenol per tablet (do not take more than 8 tablets in 24 hours)  Norco contains 325 mg of Tylenol per tablet (do not take more than 12 tablets in 24 hours). DO NOT:  -Do not give your medicine to others   -Do not take medicine unless it was prescribed for you   -Do not stop taking your medicine without talking to your healthcare provider   -Do not break, chew, crush, dissolve, or inject your medicine. If you cannot  swallow your medicine whole, talk to your healthcare provider.  -Do not drink alcohol while taking this medicine  -Do not take anti-inflammatory medications or aspirin unless instructed by your     Physician. Diet  -resume usual diet; drink plenty of fluids; eat foods high in fiber  -It is important to have regular bowel movements. Pain medications may cause constipation. You may want to take a stool softener (such as Senokot-S or Colace) to prevent constipation. If constipation occurs, take a laxative (such as Dulcolax tablets, Milk of Magnesia, or a suppository). Laxatives should only be used if the above preventable measures have failed and you still have not had a bowel movement after three days.

## 2023-02-15 NOTE — PROGRESS NOTES
02/15/23 1300   Vital Signs   Pulse (Heart Rate) 95   Resp Rate 17   BP (!) 166/87   Oxygen Therapy   O2 Sat (%) 95 %   Pulse via Oximetry 93 beats per minute   Dr Nnamdi Escobar notified pt sbp in 166s and HR in 90's. Other vss. Pt was essentially same at admit to pre op. Pain level 5/10. Medicating for pain and continuing to monitor bp.

## 2023-02-16 LAB
GLUCOSE BLD STRIP.AUTO-MCNC: 171 MG/DL (ref 65–117)
GLUCOSE BLD STRIP.AUTO-MCNC: 195 MG/DL (ref 65–117)
GLUCOSE BLD STRIP.AUTO-MCNC: 207 MG/DL (ref 65–117)
GLUCOSE BLD STRIP.AUTO-MCNC: 243 MG/DL (ref 65–117)
SERVICE CMNT-IMP: ABNORMAL

## 2023-02-16 PROCEDURE — 74011000250 HC RX REV CODE- 250: Performed by: STUDENT IN AN ORGANIZED HEALTH CARE EDUCATION/TRAINING PROGRAM

## 2023-02-16 PROCEDURE — 74011250636 HC RX REV CODE- 250/636: Performed by: PHYSICIAN ASSISTANT

## 2023-02-16 PROCEDURE — 97530 THERAPEUTIC ACTIVITIES: CPT

## 2023-02-16 PROCEDURE — 97116 GAIT TRAINING THERAPY: CPT

## 2023-02-16 PROCEDURE — 82962 GLUCOSE BLOOD TEST: CPT

## 2023-02-16 PROCEDURE — 74011250637 HC RX REV CODE- 250/637: Performed by: STUDENT IN AN ORGANIZED HEALTH CARE EDUCATION/TRAINING PROGRAM

## 2023-02-16 PROCEDURE — 74011636637 HC RX REV CODE- 636/637: Performed by: STUDENT IN AN ORGANIZED HEALTH CARE EDUCATION/TRAINING PROGRAM

## 2023-02-16 PROCEDURE — 51798 US URINE CAPACITY MEASURE: CPT

## 2023-02-16 PROCEDURE — 97161 PT EVAL LOW COMPLEX 20 MIN: CPT

## 2023-02-16 RX ORDER — DEXAMETHASONE SODIUM PHOSPHATE 4 MG/ML
8 INJECTION, SOLUTION INTRA-ARTICULAR; INTRALESIONAL; INTRAMUSCULAR; INTRAVENOUS; SOFT TISSUE EVERY 6 HOURS
Status: DISCONTINUED | OUTPATIENT
Start: 2023-02-16 | End: 2023-02-16

## 2023-02-16 RX ORDER — METHYLPREDNISOLONE 4 MG/1
TABLET ORAL
Qty: 1 DOSE PACK | Refills: 0 | Status: SHIPPED | OUTPATIENT
Start: 2023-02-16

## 2023-02-16 RX ORDER — DEXAMETHASONE SODIUM PHOSPHATE 4 MG/ML
8 INJECTION, SOLUTION INTRA-ARTICULAR; INTRALESIONAL; INTRAMUSCULAR; INTRAVENOUS; SOFT TISSUE EVERY 6 HOURS
Status: DISCONTINUED | OUTPATIENT
Start: 2023-02-16 | End: 2023-02-17 | Stop reason: HOSPADM

## 2023-02-16 RX ADMIN — Medication 3 UNITS: at 07:30

## 2023-02-16 RX ADMIN — VITAM B12 100 MCG: 100 TAB at 08:52

## 2023-02-16 RX ADMIN — SODIUM CHLORIDE, PRESERVATIVE FREE 10 ML: 5 INJECTION INTRAVENOUS at 14:00

## 2023-02-16 RX ADMIN — DEXAMETHASONE SODIUM PHOSPHATE 8 MG: 4 INJECTION, SOLUTION INTRA-ARTICULAR; INTRALESIONAL; INTRAMUSCULAR; INTRAVENOUS; SOFT TISSUE at 21:34

## 2023-02-16 RX ADMIN — SENNOSIDES AND DOCUSATE SODIUM 1 TABLET: 50; 8.6 TABLET ORAL at 17:53

## 2023-02-16 RX ADMIN — Medication 2 UNITS: at 17:52

## 2023-02-16 RX ADMIN — ATORVASTATIN CALCIUM 10 MG: 10 TABLET, FILM COATED ORAL at 21:34

## 2023-02-16 RX ADMIN — FINASTERIDE 5 MG: 5 TABLET, FILM COATED ORAL at 08:52

## 2023-02-16 RX ADMIN — ACETAMINOPHEN 1000 MG: 500 TABLET ORAL at 23:21

## 2023-02-16 RX ADMIN — OXYCODONE HYDROCHLORIDE 10 MG: 5 TABLET ORAL at 18:01

## 2023-02-16 RX ADMIN — OXYCODONE HYDROCHLORIDE 10 MG: 5 TABLET ORAL at 07:07

## 2023-02-16 RX ADMIN — LOSARTAN POTASSIUM 25 MG: 25 TABLET, FILM COATED ORAL at 08:52

## 2023-02-16 RX ADMIN — SODIUM CHLORIDE, PRESERVATIVE FREE 10 ML: 5 INJECTION INTRAVENOUS at 07:08

## 2023-02-16 RX ADMIN — HYDROCHLOROTHIAZIDE 25 MG: 25 TABLET ORAL at 08:52

## 2023-02-16 RX ADMIN — Medication 2 UNITS: at 11:30

## 2023-02-16 RX ADMIN — HYDROCHLOROTHIAZIDE 25 MG: 25 TABLET ORAL at 17:53

## 2023-02-16 RX ADMIN — ACETAMINOPHEN 1000 MG: 500 TABLET ORAL at 11:19

## 2023-02-16 RX ADMIN — GLIPIZIDE 2.5 MG: 5 TABLET ORAL at 07:07

## 2023-02-16 RX ADMIN — METFORMIN HYDROCHLORIDE 500 MG: 500 TABLET ORAL at 07:08

## 2023-02-16 RX ADMIN — METFORMIN HYDROCHLORIDE 500 MG: 500 TABLET ORAL at 17:53

## 2023-02-16 RX ADMIN — OXYCODONE HYDROCHLORIDE 10 MG: 5 TABLET ORAL at 15:15

## 2023-02-16 RX ADMIN — TERAZOSIN 10 MG: 5 CAPSULE ORAL at 21:40

## 2023-02-16 RX ADMIN — LEVOTHYROXINE, LIOTHYRONINE 60 MG: 38; 9 TABLET ORAL at 08:52

## 2023-02-16 RX ADMIN — ACETAMINOPHEN 1000 MG: 500 TABLET ORAL at 01:09

## 2023-02-16 RX ADMIN — DOXYCYCLINE HYCLATE 100 MG: 100 TABLET, COATED ORAL at 17:53

## 2023-02-16 RX ADMIN — POLYETHYLENE GLYCOL 3350 17 G: 17 POWDER, FOR SOLUTION ORAL at 08:54

## 2023-02-16 RX ADMIN — SENNOSIDES AND DOCUSATE SODIUM 1 TABLET: 50; 8.6 TABLET ORAL at 08:52

## 2023-02-16 RX ADMIN — ACETAMINOPHEN 1000 MG: 500 TABLET ORAL at 17:52

## 2023-02-16 RX ADMIN — Medication 2 UNITS: at 21:35

## 2023-02-16 RX ADMIN — DEXAMETHASONE SODIUM PHOSPHATE 8 MG: 4 INJECTION, SOLUTION INTRA-ARTICULAR; INTRALESIONAL; INTRAMUSCULAR; INTRAVENOUS; SOFT TISSUE at 17:53

## 2023-02-16 RX ADMIN — OXYCODONE HYDROCHLORIDE 5 MG: 5 TABLET ORAL at 23:21

## 2023-02-16 RX ADMIN — DOXYCYCLINE HYCLATE 100 MG: 100 TABLET, COATED ORAL at 08:52

## 2023-02-16 RX ADMIN — OXYCODONE HYDROCHLORIDE 10 MG: 5 TABLET ORAL at 11:19

## 2023-02-16 NOTE — PROGRESS NOTES
Orthopedic Spine Progress Note  Post Op day: 1 Day Post-Op    2023 9:55 AM   Admit Date: 2/15/2023  Procedure: Procedure(s):  C4-5 ANTERIOR CERVICAL DISCECTOMY WITH FUSION    Subjective:     Paul Angeles has complaints of some left sided neck pain as well as mild dysphagia. Patient resting comfortably in bed eating breakfast.   Reports he has voided without difficulty since surgery, though does have a history of having to self cath at home intermittently. + flatus    Tolerating diet. No N/V. Pain Control:   Pain Assessment  Pain Scale 1: Numeric (0 - 10)  Pain Intensity 1: 7  Pain Onset 1: post op  Pain Location 1: Neck  Pain Orientation 1: Anterior  Pain Description 1: Constant  Pain Intervention(s) 1: Meditation    Objective:          Physical Exam:  General:  Alert and oriented. No acute distress. Heart:  Respirations unlabored. Abdomen:   Extremities: Soft, non-tender. No evidence of cyanosis. Pulses palpable in both upper and lower extremities. Neurologic:  Musculoskeletal:  No new motor deficits. Neurovascular exam within normal limits. Sensation stable. Motor: unchanged C5-T1 and L2-S1. Cheko's sign negative in bilateral lower extremities. Calves soft, nontender upon palpation and with passive twitch. Moves both upper and lower extremities. Incision: clean, dry, and intact. No significant erythema or swelling. No active drainage noted. Vital Signs:    Blood pressure (!) 158/83, pulse 100, temperature 97.8 °F (36.6 °C), resp. rate 18, height 5' 10.25\" (1.784 m), weight 229 lb 4.5 oz (104 kg), SpO2 94 %. Temp (24hrs), Av.8 °F (36.6 °C), Min:96.7 °F (35.9 °C), Max:98.4 °F (36.9 °C)      LAB:    No results for input(s): HCT, HGB, PLT, HCTEXT, HGBEXT, PLTEXT in the last 72 hours.   Lab Results   Component Value Date/Time    Sodium 137 2015 04:33 AM    Potassium 3.9 2015 04:33 AM    Chloride 107 2015 04:33 AM    CO2 22 2015 04:33 AM Glucose 158 (H) 12/11/2015 04:33 AM    BUN 24 (H) 12/11/2015 04:33 AM    Creatinine 1.48 (H) 12/11/2015 04:33 AM    Calcium 7.5 (L) 12/11/2015 04:33 AM       Intake/Output:No intake/output data recorded. 02/14 1901 - 02/16 0700  In: 720 [P.O.:120; I.V.:600]  Out: 485 [Urine:475]    PT/OT:   Gait:  Gait  Speed/Gela: Slow  Step Length: Right shortened, Left shortened  Ambulation - Level of Assistance: Contact guard assistance  Distance (ft): 25 Feet (ft)  Assistive Device: Gait belt, Walker, rolling                 Assessment:   Patient is 1 Day Post-Op s/p Procedure(s):  C4-5 ANTERIOR CERVICAL DISCECTOMY WITH FUSION    Plan:     1. Continue PT/OT  2. Continue established methods of pain control  3. VTE Prophylaxes - TEDS &/or SCDs   4. Advance diet  5. Monitor dysphagia, patient type 2 diabetic, would like to avoid steroids right now. Patient has been switched to easy to chew diet. No difficulty with swallowing liquids, states he has been able to eat breakfast without much difficulty. 6.  Encouraged use of incentive spirometer.    7.  Discharge pending pain control and clearance from PT; possibly later today       Signed By: ZACK Del Cid

## 2023-02-16 NOTE — PROGRESS NOTES
Pt with urinary retention and difficult swallowing not improved over the course of today, post ACDF surgery. Discussed with patient, his wife, and Dr. Radha Jones. Will start on steroids and monitor overnight. Pt has a history of urinary retention and has had to straight cath himself at home in the past; monitor overnight. Likely will be okay to straight cath at home PRN we will have him follow up outpt with his urologist within 1 week.  Likely d/c to home tomorrow ZACK Chi

## 2023-02-16 NOTE — PROGRESS NOTES
Problem: Mobility Impaired (Adult and Pediatric)  Goal: *Acute Goals and Plan of Care (Insert Text)  Description: FUNCTIONAL STATUS PRIOR TO ADMISSION: Patient was independent and active without use of DME.    HOME SUPPORT PRIOR TO ADMISSION: The patient lived with wife but did not require assist.    Physical Therapy Goals  Initiated 2/16/2023    1. Patient will move from supine to sit and sit to supine , scoot up and down, and roll side to side in bed with modified independence within 4 days. 2. Patient will perform sit to stand with modified independence within 4 days. 3. Patient will ambulate with modified independence for 150 feet with the least restrictive device within 4 days. 4. Patient will ascend/descend 14 stairs with single handrail(s) with CGA within 4 days. 5. Patient will verbalize and demonstrate understanding of spinal precautions (No bending, lifting greater than 5 lbs, or twisting; log-roll technique; frequent repositioning as instructed) within 4 days. Outcome: Not Met  PHYSICAL THERAPY EVALUATION  Patient: Luis Kitchen (82 y.o. male)  Date: 2/16/2023  Primary Diagnosis: Spinal stenosis [M48.00]  Procedure(s) (LRB):  C4-5 ANTERIOR CERVICAL DISCECTOMY WITH FUSION (N/A) 1 Day Post-Op   Precautions: Fall, Back    ASSESSMENT  Based on the objective data described below, the patient presents with pain, mild dizziness, impaired balance, shuffled gait, and overall decline in functional mobility s/p C4-5 ACDF POD 1. At baseline, pt lives with his wife and was fully independent with mobility and ADLs. Pt provided with handout and educated on back precautions. He transferred supine>sit with SBA and additional time. Pt transferred sit<>stand and ambulated to/from bathroom with CGA and use of RW. Pt required frequent VCs for RW management and for adhering to precautions. He was able to tolerate static standing balance with SBA for ~2 minutes while performing ADLs.   Pt ended session in chair with all needs met and RN updated. Will trial ambulation with/without RW and initiate stair training in PM session as appropriate. Recommending [facility] PT upon discharge. Current Level of Function Impacting Discharge (mobility/balance): CGA for transfers and ambulation     Functional Outcome Measure: The patient scored 55/100 on the Barthel outcome measure. Other factors to consider for discharge: independent baseline, lives with wife      Patient will benefit from skilled therapy intervention to address the above noted impairments. PLAN :  Recommendations and Planned Interventions: bed mobility training, transfer training, gait training, therapeutic exercises, neuromuscular re-education, patient and family training/education, and therapeutic activities      Frequency/Duration: Patient will be followed by physical therapy:  twice daily to address goals. Recommendation for discharge: (in order for the patient to meet his/her long term goals)  Physical therapy at least 2 days/week in the home AND ensure assist and/or supervision for safety with as needed    This discharge recommendation:  Has not yet been discussed the attending provider and/or case management    IF patient discharges home will need the following DME: likely rolling walker         SUBJECTIVE:   Patient stated My 80year old mother in law just visited us.     OBJECTIVE DATA SUMMARY:   HISTORY:    Past Medical History:   Diagnosis Date    Arthritis     Asthma     Chronic kidney disease 2015    PT ONLY HAS HIS LEFT KIDNEY    Diabetes ([facility] [address]     Hypertension 2015    Ill-defined condition     NAION R EYE PARTIALLY BLIND    Sleep apnea     WEARS CPAP    Stroke ([facility] [address] 2018    TIA PER PT, VISION ISSUES W/ RT EYE NOW     Past Surgical History:   Procedure Laterality Date    HX KNEE REPLACEMENT Right     HX ORTHOPAEDIC Left     MENISCUS REPAIR R    HX ORTHOPAEDIC      ACHILLIS TENDON REPAIR R     HX ORTHOPAEDIC      R WRIST REPAIR FROM FX. HX ORTHOPAEDIC Bilateral     ROTATOR CUFF REPAIRS    HX ORTHOPAEDIC Left     BICEPS TENDON REPAIR    HX PROSTATE SURGERY  2018    MN UNLISTED PROCEDURE ABDOMEN PERITONEUM & OMENTUM      UMBILICAL HERNIA REPAIR     Home Situation  Home Environment: Private residence  # Steps to Enter: 1  One/Two Story Residence: Two story, live on 1st floor  # of Interior Steps: 14  Lift Chair Available: No  Living Alone: No  Support Systems: Spouse/Significant Other  Patient Expects to be Discharged to[de-identified] Home with family assistance  Current DME Used/Available at Home: Cane, straight    EXAMINATION/PRESENTATION/DECISION MAKING:   Critical Behavior:  Neurologic State: Alert           Hearing: Auditory  Auditory Impairment: None  Hearing Aids/Status: Does not own    Range Of Motion:  AROM: Generally decreased, functional                       Strength:    Strength: Generally decreased, functional                    Tone & Sensation:   Tone: Normal              Sensation: Intact               Coordination:  Coordination: Within functional limits  Vision:      Functional Mobility:  Bed Mobility:     Supine to Sit: Stand-by assistance; Additional time  Sit to Supine:  (up in chair)     Transfers:  Sit to Stand: Contact guard assistance  Stand to Sit: Contact guard assistance        Bed to Chair: Contact guard assistance              Balance:   Sitting: Intact  Standing: Impaired; With support (RW)  Standing - Static: Good;Constant support  Standing - Dynamic : Good;Constant support  Ambulation/Gait Training:  Distance (ft): 25 Feet (ft)  Assistive Device: Gait belt;Walker, rolling  Ambulation - Level of Assistance: Contact guard assistance                       Speed/Gela: Slow  Step Length: Right shortened;Left shortened      Functional Measure:  Barthel Index:    Bathin  Bladder: 10  Bowels: 10  Groomin  Dressin  Feeding: 10  Mobility: 0  Stairs: 0  Toilet Use: 5  Transfer (Bed to Chair and Back): 10  Total: 55/100 The Barthel ADL Index: Guidelines  1. The index should be used as a record of what a patient does, not as a record of what a patient could do. 2. The main aim is to establish degree of independence from any help, physical or verbal, however minor and for whatever reason. 3. The need for supervision renders the patient not independent. 4. A patient's performance should be established using the best available evidence. Asking the patient, friends/relatives and nurses are the usual sources, but direct observation and common sense are also important. However direct testing is not needed. 5. Usually the patient's performance over the preceding 24-48 hours is important, but occasionally longer periods will be relevant. 6. Middle categories imply that the patient supplies over 50 per cent of the effort. 7. Use of aids to be independent is allowed. Score Interpretation (from [address] [address])    Independent   60-79 Minimally independent   40-59 Partially dependent   20-39 Very dependent   <20 Totally dependent     -Donell Cardoso., Barthel, D.W. (1965). Functional evaluation: the Barthel Index. [facility] ([address] (1997). The Barthel activities of daily living index: self-reporting versus actual performance in the old (> or = 75 years). Journal of [address] [place] [address] 457), [facility], JSANDI.F, Aleida Inch., [facility]. (1999). Measuring the change in disability after inpatient rehabilitation; comparison of the responsiveness of the Barthel Index and Functional [place] Measure. Journal of Neurology, Neurosurgery, and Psychiatry, 66(4), 880-130. Carlo Randall, N.J.A, BRENDA Whitt, & Cresenciano Fabry, MYadyA. (2004) Assessment of post-stroke quality of life in cost-effectiveness studies: The usefulness of the Barthel Index and the EuroQoL-5D.  Quality of Life Research, 13, 203-35      Activity Tolerance:   Fair, VSS    After treatment patient left in no apparent distress:   Sitting in chair and Call bell within reach    COMMUNICATION/EDUCATION:   The patients plan of care was discussed with: Registered nurse. Fall prevention education was provided and the patient/caregiver indicated understanding., Patient/family have participated as able in goal setting and plan of care. , and Patient/family agree to work toward stated goals and plan of care.     Thank you for this referral.  Joanie Lund, PT, DPT   Time Calculation: 28 mins

## 2023-02-16 NOTE — PROGRESS NOTES
Problem: Mobility Impaired (Adult and Pediatric)  Goal: *Acute Goals and Plan of Care (Insert Text)  Description: FUNCTIONAL STATUS PRIOR TO ADMISSION: Patient was independent and active without use of DME.    HOME SUPPORT PRIOR TO ADMISSION: The patient lived with wife but did not require assist.    Physical Therapy Goals  Initiated 2/16/2023    1. Patient will move from supine to sit and sit to supine , scoot up and down, and roll side to side in bed with modified independence within 4 days. 2. Patient will perform sit to stand with modified independence within 4 days. 3. Patient will ambulate with modified independence for 150 feet with the least restrictive device within 4 days. 4. Patient will ascend/descend 14 stairs with single handrail(s) with CGA within 4 days. 5. Patient will verbalize and demonstrate understanding of spinal precautions (No bending, lifting greater than 5 lbs, or twisting; log-roll technique; frequent repositioning as instructed) within 4 days. Outcome: Progressing Towards Goal  PHYSICAL THERAPY TREATMENT  Patient: Sukhjinder Plaza (73 y.o. male)  Date: 2/16/2023  Diagnosis: Spinal stenosis [M48.00] <principal problem not specified>  Procedure(s) (LRB):  C4-5 ANTERIOR CERVICAL DISCECTOMY WITH FUSION (N/A) 1 Day Post-Op  Precautions: Fall, Back  Chart, physical therapy assessment, plan of care and goals were reviewed. ASSESSMENT  Patient continues with skilled PT services and is progressing towards goals. Pt able to verbalized back precautions but continues to require VCs to adhere to them during mobility. Transferred supine>sit with CGA and additional time. Pt transferred sit<>stand with CGA. Trialled ambulation with and without RW. Pt with increased unsteadiness, LOB and gait deviations without RW, therefore recommend he continue to use RW at this time for safety. Worked on stair training - pt able to tolerate 13 stairs with CGA and use of handrails.   Pt reported dizziness during mobility, however VSS throughout. Patient and wife have no further mobility questions or concerns. Patient is cleared for discharge from a mobility standpoint. Current Level of Function Impacting Discharge (mobility/balance): CGA for transfers, ambulation, and stairs    Other factors to consider for discharge: independent baseline          PLAN :  Patient continues to benefit from skilled intervention to address the above impairments. Continue treatment per established plan of care. to address goals. Recommendation for discharge: (in order for the patient to meet his/her long term goals)  Physical therapy at least 2 days/week in the home AND ensure assist and/or supervision for safety with mobility as needed    This discharge recommendation:  Has not yet been discussed the attending provider and/or case management    IF patient discharges home will need the following DME: rolling walker       SUBJECTIVE:   Patient stated I won't be playing [employer] ball tomorrow.     OBJECTIVE DATA SUMMARY:   Critical Behavior:  Neurologic State: Alert           Functional Mobility Training:  Bed Mobility:     Supine to Sit: Contact guard assistance; Additional time  Sit to Supine: Moderate assistance (BLE management)           Transfers:  Sit to Stand: Contact guard assistance  Stand to Sit: Contact guard assistance        Bed to Chair: Contact guard assistance                    Balance:  Sitting: Intact  Standing: Impaired; With support  Standing - Static: Good;Constant support  Standing - Dynamic : Fair  Ambulation/Gait Training:  Distance (ft): 50 Feet (ft) (x2 reps)  Assistive Device: Gait belt;Walker, rolling  Ambulation - Level of Assistance: Contact guard assistance        Gait Abnormalities: Decreased step clearance;Shuffling gait        Base of Support: Narrowed; Center of gravity altered     Speed/Gela: Slow;Shuffled  Step Length: Right shortened;Left shortened              [place] - Level of Assistance: Contact guard assistance     Stairs:  Number of Stairs Trained: 13      Rail Use: Both    Activity Tolerance:   Fair - VSS    After treatment patient left in no apparent distress:   Supine in bed, Call bell within reach, and Caregiver / family present    COMMUNICATION/COLLABORATION:   The patients plan of care was discussed with: Registered nurse and CHARLEE Verduzco PT, DPT   Time Calculation: 28 mins

## 2023-02-16 NOTE — PROGRESS NOTES
LEDY: anticipate d/c home with At [address] for PT/OT;     [facility] added to AVS    RW was delivered to pt's room by Cheo Love    Follow up with PCP & Specialist    Spouse transport    RUR: n/a  -C4-5 ANTERIOR CERVICAL DISCECTOMY WITH FUSION  -PT recs. HH & RW  -1400-CM reviewed pt chart & met with pt and spouse at bedside to discuss [facility] & d/c planning. Pt resides with spouse in a two story home with 1st floor living and one step to entrance. Prior to admission, pt was independent with adls/iadls. Denies DME. Pt uses [facility] on [address] for meds with no copay concerns. CM confirmed pcp/demographics/insurance. Pt denies prior [facility] and is agreeable to [facility] as they have already contacted him. Referral sent in [facility] and noted acceptance. CM sent referral to [employer] for RW. CM to follow. Octaviano Neal RN BSN CCM  Transition of Care Plan:     The Plan for Transition of Care is related to the following treatment goals: At [address]    The Patient and/or patient representative was provided with a choice of provider and agrees  with the discharge plan. Yes [x] No []    A Freedom of choice list was provided with basic dialogue that supports the patient's individualized plan of care/goals and shares the quality data associated with the providers. Yes [x] No []   Care Management Interventions  PCP Verified by CM: Yes  Palliative Care Criteria Met (RRAT>21 & CHF Dx)?: No  Mode of Transport at Discharge:  Other (see comment) (spouse )  Transition of Care Consult (CM Consult): Discharge Planning  MyChart Signup: Yes (RW)  Discharge Durable Medical Equipment: Yes  Health Maintenance Reviewed: Yes  Physical Therapy Consult: Yes  Occupational Therapy Consult: Yes  Support Systems: Spouse/Significant Other  Confirm Follow Up Transport: Family  The Patient and/or Patient Representative was Provided with a Choice of Provider and Agrees with the Discharge Plan?: Yes  Name of the Patient Representative Who was Provided with a Choice of Provider and Agrees with the Discharge Plan: patient  Freedom of Choice List was Provided with Basic Dialogue that Supports the Patient's Individualized Plan of Care/Goals, Treatment Preferences and Shares the Quality Data Associated with the Providers?: Yes  Discharge Location  Patient Expects to be Discharged to[de-identified] Home with home health.

## 2023-02-16 NOTE — OP NOTES
[address]   OPERATIVE REPORT    Name:  Charlene Schulte  MR#:  [id number]  :  1950  ACCOUNT #:  [de-identified]  DATE OF SERVICE:  02/15/2023    PREOPERATIVE DIAGNOSES:  Cervical herniated nucleus pulposus, cervical stenosis C4-5. POSTOPERATIVE DIAGNOSES:  Cervical herniated nucleus pulposus, cervical stenosis C4-5. PROCEDURES PERFORMED:  Anterior cervical diskectomy C4-5, anterior interbody fusion C4-5, anterior plate fixation Z0-9. SURGEON:  Zaira Sultana MD    ASSISTANT:  Loetta Schilder, PA-C    ANESTHESIA:  General.    COMPLICATIONS:  None. SPECIMENS REMOVED:  None. IMPLANTS:  Loetta Schilder, PAC.    ESTIMATED BLOOD LOSS:  Minimal.    INDICATIONS:  This is a pleasant 69-year-old gentleman with chronic neck and left shoulder pain. 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 The patient had failed conservative treatment, understood the risks and benefits, elected to proceed. PROCEDURE:  The patient was identified, brought to the operative suite, underwent general anesthesia without difficulty. Preoperative neuromonitoring was placed and baselines obtained. Perioperative antibiotics given to the patient. The patient was placed in supine position. Arms were tucked at the side. Neck with 10 pounds of traction with a chin strap. After prepping and draping, time-out was confirmed. A transverse incision was made on the left hand side approximately at the level of the thyroid cartilage. Dissection was carried down to the platysma. This was split longitudinally followed by blunt dissection medial to the sternocleidomastoid above the omohyoid and identified the C4-5 interval.  Lateral fluoroscopy image was used to confirm that appropriate level had been obtained.   At which time, we then did an annulotomy followed by complete diskectomy and removal of disk material with pituitary rongeurs and curved curettes. Cartilaginous endplates were all removed without difficulty. Posterior vertebral osteophytes were taken down with a Midas bur under loupe magnification. Posterior longitudinal ligament was elevated and resected for central canal decompression. with bilaterally particularly on the left hand side undercutting the uncinate processes and removal of remainder of the compression at the C4-5 level. After central bilateral foraminal decompression, we then did trial spacers with the Autotether Titan TC interbody grafting system. The medium footprint 6 x 14 mm provided good endplate coverage as well as restoration of foraminal height. 8 mm was used for restoration of foraminal height. The endplates were rasped to lightly bleeding bone. The graft was packed with Xiomara allograft. It was impacted in place with 2 mm countersinking. Weight was removed off the head. We contoured a Autotether Elite 90 mm plate to the anterior cervical spine. Temporary fixation with a threaded pin followed by 4.0 x 17 mm screws at C4 and C5. Good fixation was obtained. Locking mechanism was engaged. X-rays were stable and showed good alignment. Neuromonitoring was stable. Wounds irrigated. VersaShield was placed for anti-adhesion. FloSeal for hemostasis. 3-0 Monocryl on the skin. Steri-Strips placed. The patient returned to the PACU in stable condition. Therese Negron PAC was present for the entirety of the procedure and vital for the performance of the procedure. Therese Negron [facility] assisted with positioning, prepping, draping of the patient before the procedure and instrument manipulation/placement, spinal repositioning and navigation/robotics/flouroscopic operation during the procedure as well as wound closure, dressing application and brace application after the procedure.  Please note that no intern, resident, or other hospital staff was available to assist during the surgery Nika Olivas MD      [id number]  D:  02/15/2023 14:47  T:  02/15/2023 19:00  JOB #:  [id number]

## 2023-02-17 ENCOUNTER — APPOINTMENT (OUTPATIENT)
Dept: GENERAL RADIOLOGY | Age: 73
End: 2023-02-17
Attending: PHYSICIAN ASSISTANT
Payer: MEDICARE

## 2023-02-17 VITALS
SYSTOLIC BLOOD PRESSURE: 135 MMHG | HEART RATE: 99 BPM | BODY MASS INDEX: 32.82 KG/M2 | WEIGHT: 229.28 LBS | OXYGEN SATURATION: 95 % | TEMPERATURE: 98.5 F | HEIGHT: 70 IN | DIASTOLIC BLOOD PRESSURE: 77 MMHG | RESPIRATION RATE: 18 BRPM

## 2023-02-17 LAB
GLUCOSE BLD STRIP.AUTO-MCNC: 274 MG/DL (ref 65–117)
GLUCOSE BLD STRIP.AUTO-MCNC: 301 MG/DL (ref 65–117)
GLUCOSE BLD STRIP.AUTO-MCNC: 321 MG/DL (ref 65–117)
SERVICE CMNT-IMP: ABNORMAL

## 2023-02-17 PROCEDURE — 74011250637 HC RX REV CODE- 250/637: Performed by: PHYSICIAN ASSISTANT

## 2023-02-17 PROCEDURE — 74011250637 HC RX REV CODE- 250/637: Performed by: STUDENT IN AN ORGANIZED HEALTH CARE EDUCATION/TRAINING PROGRAM

## 2023-02-17 PROCEDURE — 92610 EVALUATE SWALLOWING FUNCTION: CPT

## 2023-02-17 PROCEDURE — 97116 GAIT TRAINING THERAPY: CPT | Performed by: PHYSICAL THERAPIST

## 2023-02-17 PROCEDURE — 71045 X-RAY EXAM CHEST 1 VIEW: CPT

## 2023-02-17 PROCEDURE — 74011636637 HC RX REV CODE- 636/637: Performed by: STUDENT IN AN ORGANIZED HEALTH CARE EDUCATION/TRAINING PROGRAM

## 2023-02-17 PROCEDURE — 74011250636 HC RX REV CODE- 250/636: Performed by: PHYSICIAN ASSISTANT

## 2023-02-17 PROCEDURE — 82962 GLUCOSE BLOOD TEST: CPT

## 2023-02-17 RX ORDER — CALCIUM CARBONATE 200(500)MG
200 TABLET,CHEWABLE ORAL
Status: DISCONTINUED | OUTPATIENT
Start: 2023-02-17 | End: 2023-02-17 | Stop reason: HOSPADM

## 2023-02-17 RX ADMIN — HYDROCHLOROTHIAZIDE 25 MG: 25 TABLET ORAL at 09:08

## 2023-02-17 RX ADMIN — DEXAMETHASONE SODIUM PHOSPHATE 8 MG: 4 INJECTION, SOLUTION INTRA-ARTICULAR; INTRALESIONAL; INTRAMUSCULAR; INTRAVENOUS; SOFT TISSUE at 09:09

## 2023-02-17 RX ADMIN — CALCIUM CARBONATE (ANTACID) CHEW TAB 500 MG 200 MG: 500 CHEW TAB at 17:19

## 2023-02-17 RX ADMIN — SENNOSIDES AND DOCUSATE SODIUM 1 TABLET: 50; 8.6 TABLET ORAL at 17:19

## 2023-02-17 RX ADMIN — ACETAMINOPHEN 1000 MG: 500 TABLET ORAL at 07:28

## 2023-02-17 RX ADMIN — LEVOTHYROXINE, LIOTHYRONINE 60 MG: 38; 9 TABLET ORAL at 09:08

## 2023-02-17 RX ADMIN — SENNOSIDES AND DOCUSATE SODIUM 1 TABLET: 50; 8.6 TABLET ORAL at 09:09

## 2023-02-17 RX ADMIN — VITAM B12 100 MCG: 100 TAB at 09:09

## 2023-02-17 RX ADMIN — Medication 5 UNITS: at 17:19

## 2023-02-17 RX ADMIN — FINASTERIDE 5 MG: 5 TABLET, FILM COATED ORAL at 09:08

## 2023-02-17 RX ADMIN — LOSARTAN POTASSIUM 25 MG: 25 TABLET, FILM COATED ORAL at 09:08

## 2023-02-17 RX ADMIN — DOXYCYCLINE HYCLATE 100 MG: 100 TABLET, COATED ORAL at 09:08

## 2023-02-17 RX ADMIN — METFORMIN HYDROCHLORIDE 500 MG: 500 TABLET ORAL at 17:19

## 2023-02-17 RX ADMIN — CALCIUM CARBONATE (ANTACID) CHEW TAB 500 MG 200 MG: 500 CHEW TAB at 12:14

## 2023-02-17 RX ADMIN — Medication 7 UNITS: at 12:15

## 2023-02-17 RX ADMIN — DOXYCYCLINE HYCLATE 100 MG: 100 TABLET, COATED ORAL at 17:19

## 2023-02-17 RX ADMIN — DEXAMETHASONE SODIUM PHOSPHATE 8 MG: 4 INJECTION, SOLUTION INTRA-ARTICULAR; INTRALESIONAL; INTRAMUSCULAR; INTRAVENOUS; SOFT TISSUE at 17:19

## 2023-02-17 RX ADMIN — GLIPIZIDE 2.5 MG: 5 TABLET ORAL at 07:28

## 2023-02-17 RX ADMIN — METFORMIN HYDROCHLORIDE 500 MG: 500 TABLET ORAL at 07:28

## 2023-02-17 RX ADMIN — POLYETHYLENE GLYCOL 3350 17 G: 17 POWDER, FOR SOLUTION ORAL at 09:09

## 2023-02-17 RX ADMIN — Medication 7 UNITS: at 07:28

## 2023-02-17 RX ADMIN — DEXAMETHASONE SODIUM PHOSPHATE 8 MG: 4 INJECTION, SOLUTION INTRA-ARTICULAR; INTRALESIONAL; INTRAMUSCULAR; INTRAVENOUS; SOFT TISSUE at 03:55

## 2023-02-17 RX ADMIN — ACETAMINOPHEN 1000 MG: 500 TABLET ORAL at 12:14

## 2023-02-17 RX ADMIN — HYDROCHLOROTHIAZIDE 25 MG: 25 TABLET ORAL at 17:19

## 2023-02-17 NOTE — PROGRESS NOTES
Problem: Mobility Impaired (Adult and Pediatric)  Goal: *Acute Goals and Plan of Care (Insert Text)  Description: FUNCTIONAL STATUS PRIOR TO ADMISSION: Patient was independent and active without use of DME.    HOME SUPPORT PRIOR TO ADMISSION: The patient lived with wife but did not require assist.    Physical Therapy Goals  Initiated 2/16/2023    1. Patient will move from supine to sit and sit to supine , scoot up and down, and roll side to side in bed with modified independence within 4 days. 2. Patient will perform sit to stand with modified independence within 4 days. 3. Patient will ambulate with modified independence for 150 feet with the least restrictive device within 4 days. 4. Patient will ascend/descend 14 stairs with single handrail(s) with CGA within 4 days. 5. Patient will verbalize and demonstrate understanding of spinal precautions (No bending, lifting greater than 5 lbs, or twisting; log-roll technique; frequent repositioning as instructed) within 4 days. 2/17/2023 1515 by Patrick Cardenas, PT  Outcome: Progressing Towards Goal  2/17/2023 1019 by Patrick Cardenas, PT  Outcome: Progressing Towards Goal  PHYSICAL THERAPY TREATMENT  Patient: Radha Hernandez (74 y.o. male)  Date: 2/17/2023  Diagnosis: Spinal stenosis [M48.00] <principal problem not specified>  Procedure(s) (LRB):  C4-5 ANTERIOR CERVICAL DISCECTOMY WITH FUSION (N/A) 2 Days Post-Op  Precautions: Fall, Back No bending, no lifting greater than 5 lbs, no twisting, log-roll technique, repositioning every 20-30 min except when sleeping, brace when OOB (if ordered)  Chart, physical therapy assessment, plan of care and goals were reviewed. ASSESSMENT  Patient continues with skilled PT services and is progressing towards goals. The patient is up in the chair on arrival.  His wife is present. He is able to ambulate 200 feet with no AD with a wide based steady gait. He is cleared for disch per PT and is here for swallowing issues. I have put him on the weekend schedule for any unanticipated PT needs that may arise prior to disch. Current Level of Function Impacting Discharge (mobility/balance): None    Other factors to consider for discharge: None         PLAN :  Patient continues to benefit from skilled intervention to address the above impairments. Continue treatment per established plan of care. to address goals. Recommendation for discharge: (in order for the patient to meet his/her long term goals)  Physical therapy at least 2 days/week in the home     This discharge recommendation:  Has been made in collaboration with the attending provider and/or case management    IF patient discharges home will need the following DME: Has a RW       SUBJECTIVE:   Patient stated I am ok to ambulate.     OBJECTIVE DATA SUMMARY:   Critical Behavior:  Neurologic State: Alert  Orientation Level: Oriented X4  Cognition: Appropriate decision making, Appropriate for age attention/concentration, Appropriate safety awareness       Spinal diagnosis intervention:  The patient stated 3/3 back precautions when prompted. Reviewed all 3 back precautions, log roll technique, and sitting for 30 minutes at a time. Functional Mobility Training:    Bed Mobility:  Log    Supine to Sit: Independent              Transfers:  Sit to Stand: Independent  Stand to Sit: Independent                             Balance:  Sitting: Intact  Standing: Intact; With support  Standing - Static: Constant support;Good  Standing - Dynamic : Constant support;Good  Ambulation/Gait Training:  Distance (ft): 200 Feet (ft)  Assistive Device: Walker, rolling;Gait belt  Ambulation - Level of Assistance: Supervision        Gait Abnormalities: Decreased step clearance        Base of Support: Widened     Speed/Gela: Pace decreased (<100 feet/min)                Activity Tolerance:   Good    After treatment patient left in no apparent distress:   Sitting in chair, Call bell within reach, and Caregiver / family present    COMMUNICATION/COLLABORATION:   The patients plan of care was discussed with: Registered nurse.      Luisito Blood PT   Time Calculation: 15 mins

## 2023-02-17 NOTE — PROGRESS NOTES
Patient persistent about going home. Patient stated they feel safe to go home and will rather discharge today. Patient states pain is under control and is swallowing food and medicine comfortably. PT stated patient is cleared from their perspective. Contacted Joao Clemens PA-C to re-evaulate patient before discharge. Ruddy Porras said \"Patient swallowing better, pain controlled, cleared PT He can go. \"

## 2023-02-17 NOTE — PROGRESS NOTES
Problem: Falls - Risk of  Goal: *Absence of Falls  Description: Document Samuel Walterronnie Fall Risk and appropriate interventions in the flowsheet.   Outcome: Progressing Towards Goal  Note: Fall Risk Interventions:                                Problem: Patient Education: Go to Patient Education Activity  Goal: Patient/Family Education  Outcome: Progressing Towards Goal     Problem: Complex Spine Procedure:  Post Op Day 1  Goal: Activity/Safety  Outcome: Progressing Towards Goal  Goal: Consults, if ordered  Outcome: Progressing Towards Goal  Goal: Nutrition/Diet  Outcome: Progressing Towards Goal  Goal: Medications  Outcome: Progressing Towards Goal  Goal: Respiratory  Outcome: Progressing Towards Goal

## 2023-02-17 NOTE — PROGRESS NOTES
I have reviewed discharge instructions with the patient and spouse. The patient and spouse verbalized understanding. Patient signed AVS all questions were answered.

## 2023-02-17 NOTE — PROGRESS NOTES
Orthopedic Spine Progress Note  Post Op day: 2 Days Post-Op    2023 8:55 AM   Admit Date: 2/15/2023  Procedure: Procedure(s):  C4-5 ANTERIOR CERVICAL DISCECTOMY WITH FUSION    Subjective:     Altamese Sprang states still having difficulty swallowing. IV steroids started. Pain control adequate. Pre op arm symptoms improved. Denies N/V, CP, SOB. Pain Control:   Pain Assessment  Pain Scale 1: Numeric (0 - 10)  Pain Intensity 1: 1  Pain Onset 1: acute  Pain Location 1: Neck  Pain Orientation 1: Left  Pain Description 1: Aching  Pain Intervention(s) 1: Nurse notified    Objective:          Physical Exam:  General:  Alert and oriented. No acute distress. Heart:  Respirations unlabored. Abdomen:   Extremities: Soft, non-tender. No evidence of cyanosis. Pulses palpable in both upper and lower extremities. Neurologic:  Musculoskeletal:  No new motor deficits. Neurovascular exam within normal limits. Sensation stable. Motor: unchanged C5-T1 and L2-S1. Cheko's sign negative in bilateral lower extremities. Calves soft, nontender upon palpation. Moves both upper and lower extremities. Incision: clean, dry, and intact. No significant erythema or swelling. No active drainage noted. Vital Signs:    Blood pressure 139/76, pulse 89, temperature 97.6 °F (36.4 °C), resp. rate 18, height 5' 10.25\" (1.784 m), weight 104 kg (229 lb 4.5 oz), SpO2 93 %. Temp (24hrs), Av.4 °F (36.9 °C), Min:97.4 °F (36.3 °C), Max:99.6 °F (37.6 °C)      LAB:    No results for input(s): HCT, HGB, PLT, HCTEXT, HGBEXT, PLTEXT in the last 72 hours.   Lab Results   Component Value Date/Time    Sodium 137 2015 04:33 AM    Potassium 3.9 2015 04:33 AM    Chloride 107 2015 04:33 AM    CO2 22 2015 04:33 AM    Glucose 158 (H) 2015 04:33 AM    BUN 24 (H) 2015 04:33 AM    Creatinine 1.48 (H) 2015 04:33 AM    Calcium 7.5 (L) 2015 04:33 AM       Intake/Output:  No intake/output data recorded. 02/15 1901 - 02/17 0700  In: 120 [P.O.:120]  Out: 475 [Urine:475]    PT/OT:   Gait:  Gait  Base of Support: Narrowed, Center of gravity altered  Speed/Gela: Slow, Shuffled  Step Length: Right shortened, Left shortened  Gait Abnormalities: Decreased step clearance, Shuffling gait  Ambulation - Level of Assistance: Contact guard assistance  Distance (ft): 50 Feet (ft) (x2 reps)  Assistive Device: Gait belt, Walker, rolling  Rail Use: Both  Number of Stairs Trained: 13  Uneven Terrain - Level of Assistance: Contact guard assistance                 Assessment:   Patient is 2 Days Post-Op s/p Procedure(s):  C4-5 ANTERIOR CERVICAL DISCECTOMY WITH FUSION    Plan:     1. Continue PT/OT - Mobilize as tolerated, soft collar at all times. 2.  Continue established methods of pain control. 3.  VTE Prophylaxes - TEDS &/or SCDs, mobilize. 4.  Advance diet as tolerated. Soft foods, liquids, will continue to monitor. Continue decadron. 5.  Case management for discharge planning. Likely d/c home later today if swallowing stable. Follow up in Dr Monica Cortes office in 2 weeks for post op care.         Signed By: Mendel Seats, PA-C

## 2023-02-17 NOTE — PROGRESS NOTES
Problem: Mobility Impaired (Adult and Pediatric)  Goal: *Acute Goals and Plan of Care (Insert Text)  Description: FUNCTIONAL STATUS PRIOR TO ADMISSION: Patient was independent and active without use of DME.    HOME SUPPORT PRIOR TO ADMISSION: The patient lived with wife but did not require assist.    Physical Therapy Goals  Initiated 2/16/2023    1. Patient will move from supine to sit and sit to supine , scoot up and down, and roll side to side in bed with modified independence within 4 days. 2. Patient will perform sit to stand with modified independence within 4 days. 3. Patient will ambulate with modified independence for 150 feet with the least restrictive device within 4 days. 4. Patient will ascend/descend 14 stairs with single handrail(s) with CGA within 4 days. 5. Patient will verbalize and demonstrate understanding of spinal precautions (No bending, lifting greater than 5 lbs, or twisting; log-roll technique; frequent repositioning as instructed) within 4 days. Outcome: Progressing Towards Goal  PHYSICAL THERAPY TREATMENT  Patient: Hillary Betancourt (55 y.o. male)  Date: 2/17/2023  Diagnosis: Spinal stenosis [M48.00] <principal problem not specified>  Procedure(s) (LRB):  C4-5 ANTERIOR CERVICAL DISCECTOMY WITH FUSION (N/A) 2 Days Post-Op  Precautions: Fall, Back No bending, no lifting greater than 5 lbs, no twisting, log-roll technique, repositioning every 20-30 min except when sleeping, brace when OOB (if ordered)  Chart, physical therapy assessment, plan of care and goals were reviewed. ASSESSMENT  Patient continues with skilled PT services and is progressing towards goals. The patient is resting in bed with a soft collar on. He is agreeable to ambulate. He is independent with bed mobility and ambulated with his RW for 200 feet. His gait is slightly wide based and waddling but he has no balance loss. He is slightly winded with this exertion. He did the steps yesterday.   He has a RW for disch and is to get [facility]. He has had difficulty swallowing so has no yet been discharged. Current Level of Function Impacting Discharge (mobility/balance): None. Other factors to consider for discharge: Difficulty swallowing. Ok for disch per PT. PLAN :  Patient continues to benefit from skilled intervention to address the above impairments. Continue treatment per established plan of care. to address goals. Recommendation for discharge: (in order for the patient to meet his/her long term goals)  Physical therapy at least 2 days/week in the home     This discharge recommendation:  Has been made in collaboration with the attending provider and/or case management    IF patient discharges home will need the following DME: The patient has a RW. SUBJECTIVE:   Patient stated I have had a hard time swallowing.     OBJECTIVE DATA SUMMARY:   Critical Behavior:  Neurologic State: Alert, Eyes open spontaneously  Orientation Level: Oriented X4  Cognition: Appropriate decision making, Appropriate for age attention/concentration, Appropriate safety awareness, Follows commands       Spinal diagnosis intervention:  The patient stated 3/3 back precautions when prompted. Reviewed all 3 back precautions, log roll technique, and sitting for 30 minutes at a time. Functional Mobility Training:    Bed Mobility:  Log    Supine to Sit: Independent              Transfers:  Sit to Stand: Independent  Stand to Sit: Independent                             Balance:  Sitting: Intact  Standing: Intact; With support  Standing - Static: Constant support;Good  Standing - Dynamic : Constant support;Good  Ambulation/Gait Training:  Distance (ft): 200 Feet (ft)  Assistive Device: Walker, rolling;Gait belt  Ambulation - Level of Assistance: Supervision        Gait Abnormalities: Decreased step clearance        Base of Support: Widened     Speed/Gela: Pace decreased (<100 feet/min) Therapeutic Exercises:   Encouraged to do ankle pumps and LAQ while up in the chair. Activity Tolerance:   Good    After treatment patient left in no apparent distress:   Sitting in chair and Call bell within reach    COMMUNICATION/COLLABORATION:   The patients plan of care was discussed with: Registered nurse.      Abhijit Polanco, PT   Time Calculation: 16 mins

## 2023-02-17 NOTE — PROGRESS NOTES
Problem: Dysphagia (Adult)  Goal: *Acute Goals and Plan of Care (Insert Text)  Description: Speech Pathology Goals  Initiated 2/17/2023  1. Patient will tolerate baseline diet with no difficulties or adverse effects within 7 days. Outcome: Progressing Towards Goal     SPEECH LANGUAGE PATHOLOGY BEDSIDE SWALLOW EVALUATION  Patient: Jeremy Chaparro (68 y.o. male)  Date: 2/17/2023  Primary Diagnosis: Spinal stenosis [M48.00]  Procedure(s) (LRB):  C4-5 ANTERIOR CERVICAL DISCECTOMY WITH FUSION (N/A) 2 Days Post-Op   Precautions:   Fall, Back    ASSESSMENT :  Based on the objective data described below, the patient presents with mild to moderate pharyngeal dysphagia likely related to post-op swelling from recent ACDF. Patient is currently POD#2. He reports that initially he was having a lot of difficulty swallowing. He reports that this is slowly improving and he was able to tolerate his easy to chew lunch tray with thin liquids well. He reports that this morning he attempted to swallow all of his pills at once and felt them \"go down the wrong way\". Since then he has felt congested. Bedside patient with effortful swallow, however no s/s of aspiration with small sips or bites. Discussed with patient and his wife the expected swallowing progression post ACDF including how peak swelling usually occurs around POD#3. Discussed safe swallowing strategies as outlined below. Discussed how if swallowing difficulties persist or worsen after POD#3 we can look in to further evaluation of swallowing. Given patient feels steady improvements with his swallowing and reported good tolerance of current consistency, recommend continue with easy to chew diet/thin liquids with precautions as outlined below. SLP to continue to follow for diet tolerance. Patient will benefit from skilled intervention to address the above impairments.   Patients rehabilitation potential is considered to be Fair     PLAN :  Recommendations and Planned Interventions:  -- easy to chew diet/thin liquids  -- meds one at a time with liquid vs puree  -- small bites and sips  -- chew solids thoroughly  -- alternate bites and sips  -- completely upright for all PO   -- SLP to follow for diet tolerance    Frequency/Duration: Patient will be followed by speech-language pathology 3 times a week to address goals. Discharge Recommendations: To Be Determined     SUBJECTIVE:   Patient stated Ryan Osborne was the first day I've been able to eat a full meal. OBJECTIVE:     Past Medical History:   Diagnosis Date    Arthritis     Asthma     Chronic kidney disease 2015    PT ONLY HAS HIS LEFT KIDNEY    Diabetes ([address]     Hypertension 2015    Ill-defined condition     NAION R EYE PARTIALLY BLIND    Sleep apnea     WEARS CPAP    Stroke ([address] 2018    TIA PER PT, VISION ISSUES W/ RT EYE NOW     Past Surgical History:   Procedure Laterality Date    HX KNEE REPLACEMENT Right     HX ORTHOPAEDIC Left     MENISCUS REPAIR R    HX ORTHOPAEDIC      ACHILLIS TENDON REPAIR R     HX ORTHOPAEDIC      R WRIST REPAIR FROM FX.     HX ORTHOPAEDIC Bilateral     ROTATOR CUFF REPAIRS    HX ORTHOPAEDIC Left     BICEPS TENDON REPAIR    HX PROSTATE SURGERY  2018    [place] UNLISTED PROCEDURE ABDOMEN PERITONEUM & OMENTUM      UMBILICAL HERNIA REPAIR     Prior Level of Function/Home Situation:   Home Situation  Home Environment: Private residence  # Steps to Enter: 1  One/Two Story Residence: Two story, live on 1st floor  # of Interior Steps: 14  Lift Chair Available: No  Living Alone: No  Support Systems: Spouse/Significant Other  Patient Expects to be Discharged to[de-identified] Home with home health  Current DME Used/Available at Home: Cane, straight  Diet prior to admission: regular/thin  Current Diet:  easy to chew/thin   Cognitive and Communication Status:  Neurologic State: Alert  Orientation Level: Oriented X4  Cognition: Follows commands  Perception: Appears intact  Perseveration: No perseveration noted  Safety/Judgement: Awareness of environment  Oral Assessment:  Oral Assessment  Labial: No impairment  Dentition: Intact  Oral Hygiene: moist  oral mucosa  Lingual: No impairment  Velum: No impairment  Mandible: No impairment  P.O. Trials:  Patient Position: upright in bed  Vocal quality prior to P.O.: Hoarse  Consistency Presented: Thin liquid;Puree; Solid  How Presented: Self-fed/presented     Bolus Acceptance: No impairment  Bolus Formation/Control: No impairment     Propulsion: No impairment  Oral Residue: None  Initiation of Swallow: No impairment  Laryngeal Elevation: Functional  Aspiration Signs/Symptoms: None  Pharyngeal Phase Characteristics: No impairment, issues, or problems   Effective Modifications: Alternate liquids/solids;Small sips and bites  Cues for Modifications: Minimal       Oral Phase Severity: No impairment  Pharyngeal Phase Severity : Mild-moderate    NOMS:   The NOMS functional outcome measure was used to quantify this patient's level of swallowing impairment. Based on the NOMS, the patient was determined to be at level 5 for swallow function       NOMS Swallowing Levels:  Level 1 (CN): NPO  Level 2 (CM): NPO but takes consistency in therapy  Level 3 (CL): Takes less than 50% of nutrition p.o. and continues with nonoral feedings; and/or safe with mod cues; and/or max diet restriction  Level 4 (CK): Safe swallow but needs mod cues; and/or mod diet restriction; and/or still requires some nonoral feeding/supplements  Level 5 (CJ): Safe swallow with min diet restriction; and/or needs min cues  Level 6 (CI): Independent with p.o.; rare cues; usually self cues; may need to avoid some foods or needs extra time  Level 7 ([address] [place] [address]): Independent for all p.o.  FRANCISCO. (2003). National Outcomes Measurement System (NOMS): Adult Speech-Language Pathology User's Guide.        Pain:  Pain Scale 1: Numeric (0 - 10)  Pain Intensity 1: 1  Pain Location 1: Neck    After treatment:   Patient left in no apparent distress in bed, Call bell within reach, Nursing notified, and Caregiver / family present    COMMUNICATION/EDUCATION:     The patient's plan of care including recommendations, planned interventions, and recommended diet changes were discussed with: Registered nurse. Patient/family have participated as able in goal setting and plan of care. Patient/family agree to work toward stated goals and plan of care.     Thank you for this referral.  Adrianne Friedman M.S. Trini Garcia Pathologist     Time Calculation: 19 mins

## 2023-02-17 NOTE — PROGRESS NOTES
Problem: Falls - Risk of  Goal: *Absence of Falls  Description: Document Linda Mccoy Fall Risk and appropriate interventions in the flowsheet.   Outcome: Resolved/Met  Note: Fall Risk Interventions:                                Problem: Patient Education: Go to Patient Education Activity  Goal: Patient/Family Education  Outcome: Resolved/Met     Problem: Patient Education: Go to Patient Education Activity  Goal: Patient/Family Education  Outcome: Resolved/Met     Problem: Patient Education: Go to Patient Education Activity  Goal: Patient/Family Education  Outcome: Resolved/Met     Problem: Patient Education: Go to Patient Education Activity  Goal: Patient/Family Education  Outcome: Resolved/Met     Problem: Complex Spine Procedure:  Day of Surgery  Goal: Off Pathway (Use only if patient is Off Pathway)  Outcome: Resolved/Met  Goal: Activity/Safety  Outcome: Resolved/Met  Goal: Consults, if ordered  Outcome: Resolved/Met  Goal: Diagnostic Test/Procedures  Outcome: Resolved/Met  Goal: Nutrition/Diet  Outcome: Resolved/Met  Goal: Discharge Planning  Outcome: Resolved/Met  Goal: Medications  Outcome: Resolved/Met  Goal: Respiratory  Outcome: Resolved/Met  Goal: Treatments/Interventions/Procedures  Outcome: Resolved/Met  Goal: Psychosocial  Outcome: Resolved/Met  Goal: *Optimal pain control at patient's stated goal  Outcome: Resolved/Met  Goal: *Demonstrates progressive activity  Outcome: Resolved/Met  Goal: *Respiratory status stable  Outcome: Resolved/Met     Problem: Complex Spine Procedure:  Post Op Day 1  Goal: Off Pathway (Use only if patient is Off Pathway)  Outcome: Resolved/Met  Goal: Activity/Safety  Outcome: Resolved/Met  Goal: Consults, if ordered  Outcome: Resolved/Met  Goal: Diagnostic Test/Procedures  Outcome: Resolved/Met  Goal: Nutrition/Diet  Outcome: Resolved/Met  Goal: Discharge Planning  Outcome: Resolved/Met  Goal: Medications  Outcome: Resolved/Met  Goal: Respiratory  Outcome: Resolved/Met  Goal: Treatments/Interventions/Procedures  Outcome: Resolved/Met  Goal: Psychosocial  Outcome: Resolved/Met  Goal: *Progress independence mobility/activities (eg: Mobility precautions)  Outcome: Resolved/Met  Goal: *Verbalizes/demonstrates understanding of proper body mechanics and use of stabilization device if ordered  Outcome: Resolved/Met  Goal: *Optimal pain control at patient's stated goal  Outcome: Resolved/Met  Goal: *Resumes normal function of bladder and bowel  Outcome: Resolved/Met  Goal: *Hemodynamically stable  Outcome: Resolved/Met  Goal: *Tolerating diet  Outcome: Resolved/Met  Goal: *Labs within defined limits  Outcome: Resolved/Met     Problem: Complex Spine Procedure:  Post Op Day 2  Goal: Off Pathway (Use only if patient is Off Pathway)  Outcome: Resolved/Met  Goal: Activity/Safety  Outcome: Resolved/Met  Goal: Diagnostic Test/Procedures  Outcome: Resolved/Met  Goal: Nutrition/Diet  Outcome: Resolved/Met  Goal: Discharge Planning  Outcome: Resolved/Met  Goal: Medications  Outcome: Resolved/Met  Goal: Respiratory  Outcome: Resolved/Met  Goal: Treatments/Interventions/Procedures  Outcome: Resolved/Met  Goal: Psychosocial  Outcome: Resolved/Met  Goal: *Progress independence mobility/activities (eg: Mobility precautions)  Outcome: Resolved/Met  Goal: *Verbalizes/demonstrates understanding of proper body mechanics and use of stabilization device if ordered  Outcome: Resolved/Met  Goal: *Optimal pain control at patient's stated goal  Outcome: Resolved/Met  Goal: *Resumes normal function of bladder and bowel  Outcome: Resolved/Met  Goal: *Hemodynamically stable  Outcome: Resolved/Met  Goal: *Tolerating diet  Outcome: Resolved/Met  Goal: *Labs within defined limits  Outcome: Resolved/Met  Goal: *Able to place stabilization device  Outcome: Resolved/Met     Problem: Complex Spine Procedure:  Post Op Day 3  Goal: Off Pathway (Use only if patient is Off Pathway)  Outcome: Resolved/Met  Goal: Activity/Safety  Outcome: Resolved/Met  Goal: Nutrition/Diet  Outcome: Resolved/Met  Goal: Discharge Planning  Outcome: Resolved/Met  Goal: Medications  Outcome: Resolved/Met  Goal: Respiratory  Outcome: Resolved/Met  Goal: Treatments/Interventions/Procedures  Outcome: Resolved/Met  Goal: Psychosocial  Outcome: Resolved/Met     Problem: Complex Spine Procedure:  Discharge Outcomes  Goal: *Optimal pain control at patient's stated goal  Outcome: Resolved/Met  Goal: *Demonstrates ability to place and remove stabilization device  Outcome: Resolved/Met  Goal: *Progress independence mobility/activities (eg: Mobility precautions)  Outcome: Resolved/Met  Goal: *Resumes normal function of bladder and bowel  Outcome: Resolved/Met  Goal: *Lungs clear or at baseline  Outcome: Resolved/Met  Goal: *Verbalizes name, dosage, time, side effects, and number of days to continue medications  Outcome: Resolved/Met  Goal: *Modified independence with transfers, ambulation on levels with assistance devices, stair climbing, ADL's  Outcome: Resolved/Met  Goal: *Describes follow-up/return visits to physicians  Outcome: Resolved/Met  Goal: *Describes available resources and support systems  Outcome: Resolved/Met  Goal: *Labs within defined limits  Outcome: Resolved/Met  Goal: *Tolerating diet  Outcome: Resolved/Met

## 2023-02-28 ENCOUNTER — OFFICE VISIT (OUTPATIENT)
Dept: ORTHOPEDIC SURGERY | Age: 73
End: 2023-02-28
Payer: MEDICARE

## 2023-02-28 VITALS — WEIGHT: 229 LBS | BODY MASS INDEX: 32.06 KG/M2 | HEIGHT: 71 IN

## 2023-02-28 DIAGNOSIS — Z98.1 S/P CERVICAL SPINAL FUSION: Primary | ICD-10-CM

## 2023-02-28 PROCEDURE — 99024 POSTOP FOLLOW-UP VISIT: CPT | Performed by: PHYSICIAN ASSISTANT

## 2023-02-28 NOTE — LETTER
2/28/2023    Patient: Jacy Rodriguez   YOB: 1950   Date of Visit: 2/28/2023     Italia Bowles MD  [address] [facility] [address]  Via Fax: [phone]    Dear Italia Bowles MD,      Thank you for referring Mr. Danna Chang to [facility] for evaluation. My notes for this consultation are attached. If you have questions, please do not hesitate to call me. I look forward to following your patient along with you.       Sincerely,    Oly Thibodeaux PA-C

## 2023-02-28 NOTE — PROGRESS NOTES
1. Have you been to the ER, urgent care clinic since your last visit? Hospitalized since your last visit? No    2. Have you seen or consulted any other health care providers outside of the [address] [place] [address] since your last visit? Include any pap smears or colon screening.  No    Chief Complaint   Patient presents with    Surgical Follow-up     Sx 2/15/23 ACDF C4/5 (PO#1)

## 2023-02-28 NOTE — PROGRESS NOTES
Sandie Perez (: 1950) is a 67 y.o. male patient here for evaluation of the following chief complaint(s):  Surgical Follow-up (Sx 2/15/23 ACDF C4/5 (PO#1))         ASSESSMENT/PLAN:  Below is the assessment and plan developed based on review of pertinent history, physical exam, labs, studies, and medications. 1. S/P cervical spinal fusion  -     XR SPINE CERV PA LAT ODONT 3 V MAX; Future      The patient's radiologic findings have been reviewed with him in detail today. He is doing exceptionally well at this point in his postoperative recovery. He will continue wearing his soft collar, and continue to limit his cervical motion lifting. He will continue with Tylenol on an as-needed basis. We will see him back for his next postoperative follow-up visit in 4 weeks with Dr. Zaira Mascorro. Return for Post op follow up, With Dr. Zaira Mascorro. SUBJECTIVE/OBJECTIVE:  Sandie Perez (: 1950) is a 67 y.o. male who presents today for the following:  Chief Complaint   Patient presents with    Surgical Follow-up     Sx 2/15/23 ACDF C4/5 (PO#1)        Surgical Follow-up     Mr. oCoper Youngblood today for routine postoperative follow-up visit. He is approximately 2 weeks out from his recent cervical fusion. He states that he is doing exceptionally well. He has no residual left arm pain and has minimal neck stiffness and tightness due to immobility. He is tolerating his soft collar well and denies any incisional difficulties. He is only using Tylenol intermittently for pain relief. IMAGING:  XR Results (most recent):  Results from Appointment encounter on 23    XR SPINE CERV PA LAT ODONT 3 V MAX    Narrative  AP and lateral view digital radiographs of the cervical spine obtained in the office today demonstrate good alignment of anterior instrumentation at C4-C5 with no evidence of hardware loosening or subsidence.        MRI Results (most recent):  Results from Appointment encounter on 22    MRI [id number] [facility] CONT    Narrative  EXAM: MRI CERV SPINE WO CONT    INDICATION: .  Pain    Exam: MRI cervical spine. Comparisons: none    Sequences: Sagittal T1, T2, and STIR. Axial T1, T2. No contrast.    FINDINGS: Alignment is normal. There are no suspicious marrow lesions or  evidence of acute bony trauma. The visualized posterior fossa and cord signal  are normal. Paraspinous soft tissues are within normal limits. Craniocervical junction: Intact. Without stenosis    C2-C3: Left-sided uncovertebral degenerative change and facet arthropathy. Mild  left foraminal narrowing    C3-C4: Right-sided uncovertebral degenerative change. Bilateral facet  arthropathy. Moderate right foraminal narrowing without canal stenosis    C4-C5: Left-sided uncovertebral degenerative change and facet arthropathy. Severe left foraminal narrowing and slight indentation of the thecal sac    C5-C6: Slight disc osteophytic bulge with uncovertebral degenerative change and  left facet arthropathy. Minimal indentation of the ventral thecal sac with mild  left greater than right foraminal narrowing    C6-C7: Small disc osteophytic bulge with facet arthropathy and uncovertebral  degenerative change. Mild canal and foraminal narrowing    Examination of the upper thoracic spine demonstrates no significant stenosis. Impression  1. Multilevel degenerative change detailed by level above most significant at  C4-5       Allergies   Allergen Reactions    Shellfish Derived Itching       Current Outpatient Medications   Medication Sig    methylPREDNISolone (MEDROL DOSEPACK) 4 mg tablet As directed. naloxone (Narcan) 4 mg/actuation nasal spray Use 1 spray intranasally, then discard. Repeat with new spray every 2 min as needed for opioid overdose symptoms, alternating nostrils. hydroCHLOROthiazide (HYDRODIURIL) 25 mg tablet Take 25 mg by mouth two (2) times a day. losartan (COZAAR) 25 mg tablet Take 25 mg by mouth daily.     glimepiride (AMARYL) 1 mg tablet Take 1 mg by mouth Daily (before breakfast). doxycycline (MONODOX) 100 mg capsule Take 100 mg by mouth two (2) times a day. thyroid, Pork, (ARMOUR) 60 mg tablet Take 60 mg by mouth daily. finasteride (PROSCAR) 5 mg tablet Take 5 mg by mouth daily. cyanocobalamin (VITAMIN B12) 100 mcg tablet Take 100 mcg by mouth daily. metFORMIN (GLUCOPHAGE) 500 mg tablet Take 500 mg by mouth two (2) times daily (with meals). 1 PILL PO QAM , 2 PO WITH DINNER    terazosin (HYTRIN) 5 mg capsule Take 10 mg by mouth nightly. Indications: SYMPTOMATIC BENIGN PROSTATIC HYPERPLASIA    atorvastatin (LIPITOR) 10 mg tablet Take 10 mg by mouth nightly. No current facility-administered medications for this visit. Past Medical History:   Diagnosis Date    Arthritis     Asthma     Chronic kidney disease 2015    PT ONLY HAS HIS LEFT KIDNEY    Diabetes ([address]     Hypertension 2015    Ill-defined condition     NAION R EYE PARTIALLY BLIND    Sleep apnea     WEARS CPAP    Stroke ([facility] [address] 2018    TIA PER PT, VISION ISSUES W/ RT EYE NOW        Past Surgical History:   Procedure Laterality Date    HX KNEE REPLACEMENT Right     HX ORTHOPAEDIC Left     MENISCUS REPAIR R    HX ORTHOPAEDIC      ACHILLIS TENDON REPAIR R     HX ORTHOPAEDIC      R WRIST REPAIR FROM FX. HX ORTHOPAEDIC Bilateral     ROTATOR CUFF REPAIRS    HX ORTHOPAEDIC Left     BICEPS TENDON REPAIR    HX PROSTATE SURGERY  2018    [place] UNLISTED PROCEDURE ABDOMEN PERITONEUM & OMENTUM      UMBILICAL HERNIA REPAIR       Family History   Problem Relation Age of Onset    Seizures Mother     OSTEOARTHRITIS Mother     Stroke Mother     Alzheimer's Disease Father     Anesth Problems Neg Hx         Social History     Tobacco Use    Smoking status: Never    Smokeless tobacco: Never   Substance Use Topics    Alcohol use: Not Currently     Comment: 2 DRINKS A MONTH        Review of Systems   Constitutional: Negative. Respiratory: Negative. Cardiovascular: Negative. Gastrointestinal: Negative. Endocrine: Negative. Genitourinary: Negative. Musculoskeletal:  Positive for neck pain and neck stiffness. Skin: Negative. Allergic/Immunologic: Negative. Hematological: Negative. Psychiatric/Behavioral: Negative. All other systems reviewed and are negative. No flowsheet data found. Vitals:  Ht 5' 10.5\" (1.791 m)   Wt 229 lb (103.9 kg)   BMI 32.39 kg/m²    Body mass index is 32.39 kg/m². Physical Exam    Integumentary  Assessment of Surgical Incision - healing and consistent with normal anticipated wound healing. Neurologic  Overall Assessment of Muscle Strength and Tone reveals  Upper Extremities - Right Deltoid - 5/5. Left Deltoid - 5/5. Right Bicep - 5/5. Left Bicep - 5/5. Right Tricep - 5/5. Left Tricep - 5/5. Right Wrist Extensors - 5/5. Left Wrist Extensors - 5/5. Right Wrist Flexors - 5/5. Left Wrist Flexors - 5/5. Right Intrinsics - 5/5. Left Intrinsics - 5/5. General Assessment of Reflexes  Right Hand - Argueta's sign is negative in the right hand. Left Hand - Argueta's sign is negative in the left hand. Reflexes (Dermatomes)  2/2 Normal - Left Bicep (C5-6), Left Tricep (C7-8), Left Brachioradialis (C5-6), Right Bicep (C5-6), Right Tricep (C7-8) and Right Brachioradialis (C5-6). Musculoskeletal  Global Assessment  Examination of related systems reveals - well-developed, well-nourished, in no acute distress, alert and oriented x 3 and normal coordination. Gait and Station - normal gait and station and normal posture. Spine/Ribs/Pelvis  Cervical Spine - Evaluation of related systems reveals - no lymphadenopathy and neurovascularly intact bilaterally. Inspection and Palpation - Tenderness - moderate and localized. Assessment of pain reveals the following findings: - Location - cervical area. Dr. Yahaira Kumar was available for immediate consult during this encounter.   An electronic signature was used to authenticate this note.  -- ZACK Mota

## 2023-03-28 ENCOUNTER — OFFICE VISIT (OUTPATIENT)
Dept: ORTHOPEDIC SURGERY | Age: 73
End: 2023-03-28
Payer: MEDICARE

## 2023-03-28 VITALS — WEIGHT: 250 LBS | HEIGHT: 71 IN | BODY MASS INDEX: 35 KG/M2

## 2023-03-28 DIAGNOSIS — Z98.1 S/P CERVICAL SPINAL FUSION: Primary | ICD-10-CM

## 2023-03-28 PROCEDURE — 99024 POSTOP FOLLOW-UP VISIT: CPT | Performed by: ORTHOPAEDIC SURGERY

## 2023-03-28 NOTE — PROGRESS NOTES
Yin Porras (: 1950) is a 75 y.o. male, patient, here for evaluation of the following chief complaint(s):  Surgical Follow-up       ASSESSMENT/PLAN:  Below is the assessment and plan developed based on review of pertinent history, physical exam, labs, studies, and medications. Patient presents today approximately 6 weeks status post recent ACDF. He is doing very well overall. No weakness on physical exam.  Radiologic findings reviewed in detail with the patient. He will continue to increase activities as tolerated. Questions answered. He may take over-the-counter medications if needed. He will follow-up in about 6 weeks. 1. S/P cervical spinal fusion  -     XR SPINE CERV PA LAT ODONT 3 V MAX; Future      No follow-ups on file. SUBJECTIVE/OBJECTIVE:  Yin Porras (: 1950) is a 75 y.o. male. Pain Assessment  3/28/2023   Location of Pain Neck   Severity of Pain 0        Patient presents today approximately 6-month status post recent ACDF. He continues to do very well overall. He states he has not had any pain since surgery. Not taking any pain medication. Denies any radiating pain, numbness or tingling in upper extremities. No changes in bowels. No difficulty with fine motor movements or dropping objects. He has been compliant with his restrictions today. He has weaned out of his cervical soft collar. No other acute changes. Imaging:    XR Results (most recent):  Results from Appointment encounter on 23    XR SPINE CERV PA LAT ODONT 3 V MAX    Narrative  AP and lateral of the cervical spine reviewed today. Stable C4-C5 fusion         Allergies   Allergen Reactions    Shellfish Derived Itching       Current Outpatient Medications   Medication Sig    hydroCHLOROthiazide (HYDRODIURIL) 25 mg tablet Take 25 mg by mouth two (2) times a day. losartan (COZAAR) 25 mg tablet Take 25 mg by mouth daily.     glimepiride (AMARYL) 1 mg tablet Take 1 mg by mouth Daily (before breakfast). thyroid, Pork, (ARMOUR) 60 mg tablet Take 60 mg by mouth daily. finasteride (PROSCAR) 5 mg tablet Take 5 mg by mouth daily. cyanocobalamin (VITAMIN B12) 100 mcg tablet Take 100 mcg by mouth daily. metFORMIN (GLUCOPHAGE) 500 mg tablet Take 500 mg by mouth two (2) times daily (with meals). 1 PILL PO QAM , 2 PO WITH DINNER    terazosin (HYTRIN) 5 mg capsule Take 10 mg by mouth nightly. Indications: SYMPTOMATIC BENIGN PROSTATIC HYPERPLASIA    atorvastatin (LIPITOR) 10 mg tablet Take 10 mg by mouth nightly. methylPREDNISolone (MEDROL DOSEPACK) 4 mg tablet As directed. (Patient not taking: Reported on 3/28/2023)    naloxone (Narcan) 4 mg/actuation nasal spray Use 1 spray intranasally, then discard. Repeat with new spray every 2 min as needed for opioid overdose symptoms, alternating nostrils. (Patient not taking: Reported on 3/28/2023)    doxycycline (MONODOX) 100 mg capsule Take 100 mg by mouth two (2) times a day. (Patient not taking: Reported on 3/28/2023)     No current facility-administered medications for this visit. Past Medical History:   Diagnosis Date    Arthritis     Asthma     Chronic kidney disease 2015    PT ONLY HAS HIS LEFT KIDNEY    Diabetes ([address]     Hypertension 2015    Ill-defined condition     NAION R EYE PARTIALLY BLIND    Sleep apnea     WEARS CPAP    Stroke ([address] 2018    TIA PER PT, VISION ISSUES W/ RT EYE NOW        Past Surgical History:   Procedure Laterality Date    HX KNEE REPLACEMENT Right     HX ORTHOPAEDIC Left     MENISCUS REPAIR R    HX ORTHOPAEDIC      ACHILLIS TENDON REPAIR R     HX ORTHOPAEDIC      R WRIST REPAIR FROM FX.     HX ORTHOPAEDIC Bilateral     ROTATOR CUFF REPAIRS    HX ORTHOPAEDIC Left     BICEPS TENDON REPAIR    HX PROSTATE SURGERY  2018    [place] UNLISTED PROCEDURE ABDOMEN PERITONEUM & OMENTUM      UMBILICAL HERNIA REPAIR       Family History   Problem Relation Age of Onset    Seizures Mother     OSTEOARTHRITIS Mother     Stroke Mother Alzheimer's Disease Father     Anesth Problems Neg Hx         Social History     Tobacco Use    Smoking status: Never    Smokeless tobacco: Never   Vaping Use    Vaping Use: Never used   Substance Use Topics    Alcohol use: Not Currently     Comment: 2 DRINKS A MONTH    Drug use: Never        Review of Systems   Constitutional:  Negative for chills and fever. Musculoskeletal:  Negative for neck pain and neck stiffness. Neurological:  Negative for weakness and numbness. All other systems reviewed and are negative. Vitals:  Ht 5' 11\" (1.803 m)   Wt 250 lb (113.4 kg)   BMI 34.87 kg/m²    Body mass index is 34.87 kg/m². Physical Exam  Integumentary  Assessment of Surgical Incision - healing and consistent with normal anticipated wound healing. Neurologic  Overall Assessment of Muscle Strength and Tone reveals  Upper Extremities - Right Deltoid - 5/5. Left Deltoid - 5/5. Right Bicep - 5/5. Left Bicep - 5/5. Right Tricep - 5/5. Left Tricep - 5/5. Right Wrist Extensors - 5/5. Left Wrist Extensors - 5/5. Right Wrist Flexors - 5/5. Left Wrist Flexors - 5/5. Right Intrinsics - 5/5. Left Intrinsics - 5/5. General Assessment of Reflexes  Right Hand - Argueta's sign is negative in the right hand. Left Hand - Argueta's sign is negative in the left hand. Reflexes (Dermatomes)  2/2 Normal - Left Bicep (C5-6), Left Tricep (C7-8), Left Brachioradialis (C5-6), Right Bicep (C5-6), Right Tricep (C7-8) and Right Brachioradialis (C5-6). Musculoskeletal  Global Assessment  Examination of related systems reveals - well-developed, well-nourished, in no acute distress, alert and oriented x 3 and normal coordination. Gait and Station - normal gait and station and normal posture. Spine/Ribs/Pelvis  Cervical Spine - Evaluation of related systems reveals - no lymphadenopathy and neurovascularly intact bilaterally. Inspection and Palpation - Tenderness - moderate and localized.   Assessment of pain reveals the following findings: - Location - cervical area. Dr. Jeff Garvin was available for immediate consult during this encounter. An electronic signature was used to authenticate this note.   -- ZACK Moser

## 2024-03-09 ENCOUNTER — HOSPITAL ENCOUNTER (OUTPATIENT)
Facility: HOSPITAL | Age: 74
End: 2024-03-09
Attending: ORTHOPAEDIC SURGERY
Payer: MEDICARE

## 2024-03-09 DIAGNOSIS — M25.552 LEFT HIP PAIN: ICD-10-CM

## 2024-03-09 DIAGNOSIS — M25.551 RIGHT HIP PAIN: ICD-10-CM

## 2024-03-09 PROCEDURE — 73721 MRI JNT OF LWR EXTRE W/O DYE: CPT

## 2024-03-29 ENCOUNTER — HOSPITAL ENCOUNTER (OUTPATIENT)
Facility: HOSPITAL | Age: 74
End: 2024-03-29
Attending: ORTHOPAEDIC SURGERY
Payer: MEDICARE

## 2024-03-29 DIAGNOSIS — M16.11 OSTEOARTHRITIS OF RIGHT HIP, UNSPECIFIED OSTEOARTHRITIS TYPE: ICD-10-CM

## 2024-03-29 DIAGNOSIS — M16.12 OSTEOARTHRITIS OF LEFT HIP, UNSPECIFIED OSTEOARTHRITIS TYPE: ICD-10-CM

## 2024-03-29 PROCEDURE — 2500000003 HC RX 250 WO HCPCS: Performed by: STUDENT IN AN ORGANIZED HEALTH CARE EDUCATION/TRAINING PROGRAM

## 2024-03-29 PROCEDURE — 6360000002 HC RX W HCPCS: Performed by: STUDENT IN AN ORGANIZED HEALTH CARE EDUCATION/TRAINING PROGRAM

## 2024-03-29 PROCEDURE — 20610 DRAIN/INJ JOINT/BURSA W/O US: CPT

## 2024-03-29 PROCEDURE — 6360000004 HC RX CONTRAST MEDICATION: Performed by: STUDENT IN AN ORGANIZED HEALTH CARE EDUCATION/TRAINING PROGRAM

## 2024-03-29 RX ORDER — TRIAMCINOLONE ACETONIDE 40 MG/ML
80 INJECTION, SUSPENSION INTRA-ARTICULAR; INTRAMUSCULAR ONCE
Status: COMPLETED | OUTPATIENT
Start: 2024-03-29 | End: 2024-03-29

## 2024-03-29 RX ORDER — BUPIVACAINE HYDROCHLORIDE 2.5 MG/ML
10 INJECTION, SOLUTION EPIDURAL; INFILTRATION; INTRACAUDAL ONCE
Status: COMPLETED | OUTPATIENT
Start: 2024-03-29 | End: 2024-03-29

## 2024-03-29 RX ORDER — LIDOCAINE HYDROCHLORIDE 10 MG/ML
10 INJECTION, SOLUTION EPIDURAL; INFILTRATION; INTRACAUDAL; PERINEURAL ONCE
Status: COMPLETED | OUTPATIENT
Start: 2024-03-29 | End: 2024-03-29

## 2024-03-29 RX ADMIN — IOHEXOL 5 ML: 180 INJECTION INTRAVENOUS at 14:11

## 2024-03-29 RX ADMIN — TRIAMCINOLONE ACETONIDE 40 MG: 40 INJECTION, SUSPENSION INTRA-ARTICULAR; INTRAMUSCULAR at 14:14

## 2024-03-29 RX ADMIN — LIDOCAINE HYDROCHLORIDE 10 ML: 10 INJECTION, SOLUTION EPIDURAL; INFILTRATION; INTRACAUDAL; PERINEURAL at 14:15

## 2024-03-29 RX ADMIN — BUPIVACAINE HYDROCHLORIDE 3 ML: 2.5 INJECTION, SOLUTION EPIDURAL; INFILTRATION; INTRACAUDAL; PERINEURAL at 14:13

## 2024-06-05 ENCOUNTER — HOSPITAL ENCOUNTER (OUTPATIENT)
Facility: HOSPITAL | Age: 74
Discharge: HOME | End: 2024-06-08
Payer: MEDICARE

## 2024-06-05 DIAGNOSIS — Z01.818 PREOP EXAMINATION: ICD-10-CM

## 2024-06-05 LAB
EKG ATRIAL RATE: 87 BPM
EKG DIAGNOSIS: NORMAL
EKG P AXIS: 49 DEGREES
EKG P-R INTERVAL: 182 MS
EKG Q-T INTERVAL: 362 MS
EKG QRS DURATION: 76 MS
EKG QTC CALCULATION (BAZETT): 435 MS
EKG R AXIS: 17 DEGREES
EKG T AXIS: 50 DEGREES
EKG VENTRICULAR RATE: 87 BPM

## 2024-06-05 PROCEDURE — 93005 ELECTROCARDIOGRAM TRACING: CPT | Performed by: ORTHOPAEDIC SURGERY

## (undated) DEVICE — COVER,MAYO STAND,STERILE: Brand: MEDLINE

## (undated) DEVICE — TOOL 14MH30 LEGEND 14CM 3MM: Brand: MIDAS REX ™

## (undated) DEVICE — FLOSEAL HEMOSTATIC MATRIX, 5ML: Brand: FLOSEAL HEMOSTATIC MATRIX

## (undated) DEVICE — SYRINGE MED 30ML STD CLR PLAS LUERLOCK TIP N CTRL DISP

## (undated) DEVICE — RESERVOIR,SUCTION,100CC,SILICONE: Brand: MEDLINE

## (undated) DEVICE — GLOVE SURG SZ 8 L12IN FNGR THK79MIL GRN LTX FREE

## (undated) DEVICE — DRAIN SURG FLAT W7MMXL20CM FULL PERF

## (undated) DEVICE — SOL INJ SOD CL 0.9% 250ML BG --

## (undated) DEVICE — PAD,NON-ADHERENT,3X8,STERILE,LF,1/PK: Brand: MEDLINE

## (undated) DEVICE — SUTURE ABSRB L30CM 2-0 VLT SPRL PDS + STRATAFIX SXPP1B410

## (undated) DEVICE — C-ARM: Brand: UNBRANDED

## (undated) DEVICE — TELFA NON-ADHERENT ABSORBENT DRESSING: Brand: TELFA

## (undated) DEVICE — HALTER TRACTION HD W/ TRI COTTON LINING FOAM LTX

## (undated) DEVICE — GLOVE SURG SZ 75 L12IN FNGR THK94MIL STD WHT LTX FREE

## (undated) DEVICE — DRILL BIT 7080510 11 MM DRILL BIT S

## (undated) DEVICE — BONE WAX WHITE: Brand: BONE WAX WHITE

## (undated) DEVICE — SUTURE STRATAFIX SPRL MCRYL + SZ 3 0 L8IN ABSRB UD L26MM SH SXMP1B427

## (undated) DEVICE — ANTERIOR CERVICAL-SMH: Brand: MEDLINE INDUSTRIES, INC.

## (undated) DEVICE — TAPE,CLOTH/SILK,CURAD,3"X10YD,LF,40/CS: Brand: CURAD

## (undated) DEVICE — BIPOLAR IRRIGATOR INTEGRATED TUBING AND BIPOLAR CORD SET, DISPOSABLE

## (undated) DEVICE — STRIP,CLOSURE,WOUND,MEDI-STRIP,1/2X4: Brand: MEDLINE

## (undated) DEVICE — MASTISOL ADHESIVE LIQ 2/3ML